# Patient Record
Sex: FEMALE | Race: WHITE | NOT HISPANIC OR LATINO | Employment: FULL TIME | URBAN - METROPOLITAN AREA
[De-identification: names, ages, dates, MRNs, and addresses within clinical notes are randomized per-mention and may not be internally consistent; named-entity substitution may affect disease eponyms.]

---

## 2023-03-03 LAB
ALANINE AMINOTRANSFERASE (SGPT) (U/L) IN SER/PLAS: 18 U/L (ref 7–45)
ALBUMIN (G/DL) IN SER/PLAS: 4.4 G/DL (ref 3.4–5)
ALKALINE PHOSPHATASE (U/L) IN SER/PLAS: 94 U/L (ref 33–110)
ANION GAP IN SER/PLAS: 10 MMOL/L (ref 10–20)
ASPARTATE AMINOTRANSFERASE (SGOT) (U/L) IN SER/PLAS: 17 U/L (ref 9–39)
BILIRUBIN TOTAL (MG/DL) IN SER/PLAS: 0.5 MG/DL (ref 0–1.2)
CALCIDIOL (25 OH VITAMIN D3) (NG/ML) IN SER/PLAS: 26 NG/ML
CALCIUM (MG/DL) IN SER/PLAS: 10 MG/DL (ref 8.6–10.6)
CARBON DIOXIDE, TOTAL (MMOL/L) IN SER/PLAS: 31 MMOL/L (ref 21–32)
CHLORIDE (MMOL/L) IN SER/PLAS: 106 MMOL/L (ref 98–107)
CHOLESTEROL (MG/DL) IN SER/PLAS: 202 MG/DL (ref 0–199)
CHOLESTEROL IN HDL (MG/DL) IN SER/PLAS: 57.4 MG/DL
CHOLESTEROL/HDL RATIO: 3.5
CREATININE (MG/DL) IN SER/PLAS: 0.63 MG/DL (ref 0.5–1.05)
ERYTHROCYTE DISTRIBUTION WIDTH (RATIO) BY AUTOMATED COUNT: 12.8 % (ref 11.5–14.5)
ERYTHROCYTE MEAN CORPUSCULAR HEMOGLOBIN CONCENTRATION (G/DL) BY AUTOMATED: 33.5 G/DL (ref 32–36)
ERYTHROCYTE MEAN CORPUSCULAR VOLUME (FL) BY AUTOMATED COUNT: 93 FL (ref 80–100)
ERYTHROCYTES (10*6/UL) IN BLOOD BY AUTOMATED COUNT: 4.01 X10E12/L (ref 4–5.2)
ESTIMATED AVERAGE GLUCOSE FOR HBA1C: 85 MG/DL
GFR FEMALE: >90 ML/MIN/1.73M2
GLUCOSE (MG/DL) IN SER/PLAS: 86 MG/DL (ref 74–99)
HEMATOCRIT (%) IN BLOOD BY AUTOMATED COUNT: 37.3 % (ref 36–46)
HEMOGLOBIN (G/DL) IN BLOOD: 12.5 G/DL (ref 12–16)
HEMOGLOBIN A1C/HEMOGLOBIN TOTAL IN BLOOD: 4.6 %
HEPATITIS B VIRUS SURFACE AB (MIU/ML) IN SERUM: 19 MIU/ML
HEPATITIS B VIRUS SURFACE AG PRESENCE IN SERUM: NONREACTIVE
HEPATITIS C VIRUS AB PRESENCE IN SERUM: NONREACTIVE
LDL: 118 MG/DL (ref 0–99)
LEUKOCYTES (10*3/UL) IN BLOOD BY AUTOMATED COUNT: 6.7 X10E9/L (ref 4.4–11.3)
NRBC (PER 100 WBCS) BY AUTOMATED COUNT: 0 /100 WBC (ref 0–0)
PLATELETS (10*3/UL) IN BLOOD AUTOMATED COUNT: 217 X10E9/L (ref 150–450)
POTASSIUM (MMOL/L) IN SER/PLAS: 5 MMOL/L (ref 3.5–5.3)
PROTEIN TOTAL: 7.4 G/DL (ref 6.4–8.2)
SODIUM (MMOL/L) IN SER/PLAS: 142 MMOL/L (ref 136–145)
THYROTROPIN (MIU/L) IN SER/PLAS BY DETECTION LIMIT <= 0.05 MIU/L: 1.35 MIU/L (ref 0.44–3.98)
TRIGLYCERIDE (MG/DL) IN SER/PLAS: 131 MG/DL (ref 0–149)
UREA NITROGEN (MG/DL) IN SER/PLAS: 12 MG/DL (ref 6–23)
VLDL: 26 MG/DL (ref 0–40)

## 2023-05-16 ENCOUNTER — OFFICE VISIT (OUTPATIENT)
Dept: PRIMARY CARE | Facility: CLINIC | Age: 33
End: 2023-05-16
Payer: COMMERCIAL

## 2023-05-16 VITALS
WEIGHT: 198 LBS | BODY MASS INDEX: 33.8 KG/M2 | HEIGHT: 64 IN | HEART RATE: 87 BPM | SYSTOLIC BLOOD PRESSURE: 108 MMHG | OXYGEN SATURATION: 97 % | DIASTOLIC BLOOD PRESSURE: 73 MMHG

## 2023-05-16 DIAGNOSIS — Z00.00 HEALTHCARE MAINTENANCE: Primary | ICD-10-CM

## 2023-05-16 DIAGNOSIS — E66.9 OBESITY (BMI 30-39.9): ICD-10-CM

## 2023-05-16 DIAGNOSIS — R63.5 WEIGHT GAIN: ICD-10-CM

## 2023-05-16 DIAGNOSIS — E78.5 HYPERLIPIDEMIA, UNSPECIFIED HYPERLIPIDEMIA TYPE: ICD-10-CM

## 2023-05-16 PROCEDURE — 99395 PREV VISIT EST AGE 18-39: CPT | Performed by: STUDENT IN AN ORGANIZED HEALTH CARE EDUCATION/TRAINING PROGRAM

## 2023-05-16 PROCEDURE — 3008F BODY MASS INDEX DOCD: CPT | Performed by: STUDENT IN AN ORGANIZED HEALTH CARE EDUCATION/TRAINING PROGRAM

## 2023-05-16 NOTE — PROGRESS NOTES
"Subjective   Patient ID: Radha Haley is a 32 y.o. female who presents for physical  HPI  Ms. Lu is 32 years old with known hyperlipidemia here for a physical.  Denies any complaints has weight concerns.  Review of Systems   Constitutional:  Negative for activity change and fever.   HENT:  Negative for congestion.    Respiratory:  Negative for cough, shortness of breath and wheezing.    Cardiovascular:  Negative for chest pain and leg swelling.   Gastrointestinal:  Negative for abdominal pain, constipation, nausea and vomiting.   Endocrine: Negative for cold intolerance.   Genitourinary:  Negative for dysuria, hematuria and urgency.   Neurological:  Negative for dizziness, speech difficulty, weakness and numbness.   Psychiatric/Behavioral:  Negative for self-injury and suicidal ideas.        Objective   Visit Vitals  /73   Pulse 87   Ht 1.626 m (5' 4\")   Wt 89.8 kg (198 lb)   SpO2 97%   BMI 33.99 kg/m²   BSA 2.01 m²      Physical Exam  HENT:      Head: Normocephalic and atraumatic.      Right Ear: Tympanic membrane normal.      Left Ear: Tympanic membrane normal.      Nose: Nose normal.      Mouth/Throat:      Mouth: Mucous membranes are moist.   Eyes:      Extraocular Movements: Extraocular movements intact.      Conjunctiva/sclera: Conjunctivae normal.      Pupils: Pupils are equal, round, and reactive to light.   Cardiovascular:      Rate and Rhythm: Normal rate and regular rhythm.      Pulses: Normal pulses.      Heart sounds: Normal heart sounds.   Pulmonary:      Effort: Pulmonary effort is normal.      Breath sounds: Normal breath sounds. No stridor. No rhonchi.   Abdominal:      General: Bowel sounds are normal.      Palpations: Abdomen is soft.      Tenderness: There is no abdominal tenderness. There is no guarding or rebound.   Musculoskeletal:      Cervical back: Neck supple.   Neurological:      Mental Status: She is oriented to person, place, and time.   Psychiatric:         Mood and Affect: Mood " normal.         Behavior: Behavior normal.         Assessment/Plan          Problem List Items Addressed This Visit    None  Visit Diagnoses       Healthcare maintenance    -  Primary    Relevant Orders    Lipid Panel (Completed)    CBC (Completed)    Comprehensive Metabolic Panel (Completed)    TSH with reflex to Free T4 if abnormal (Completed)    Hemoglobin A1C (Completed)    Testosterone (Completed)    Referral to Comprehensive Weight Loss    Referral to Nutrition Services    Vitamin D 25 hydroxy (Completed)    BMI 33.0-33.9,adult        Relevant Orders    Referral to Comprehensive Weight Loss    Referral to Nutrition Services    Obesity (BMI 30-39.9)        Relevant Orders    Referral to Comprehensive Weight Loss    Referral to Nutrition Services    Weight gain        Relevant Orders    TSH with reflex to Free T4 if abnormal (Completed)    Testosterone (Completed)    Hyperlipidemia, unspecified hyperlipidemia type        Relevant Orders    Lipid Panel (Completed)    TSH with reflex to Free T4 if abnormal (Completed)        Overall patient has a physical exam within normal limits except for BMI of 33.99.  Advise her to see nutrition services along with comprehensive weight loss program.  As per hyperlipidemia, we will get repeat labs and update her current labs.  Once results are obtained we will call her with abnormal results of any and discuss further evaluation and manage

## 2023-05-24 LAB
ALANINE AMINOTRANSFERASE (SGPT) (U/L) IN SER/PLAS: 21 U/L (ref 7–45)
ALBUMIN (G/DL) IN SER/PLAS: 4.5 G/DL (ref 3.4–5)
ALKALINE PHOSPHATASE (U/L) IN SER/PLAS: 88 U/L (ref 33–110)
ANION GAP IN SER/PLAS: 13 MMOL/L (ref 10–20)
ASPARTATE AMINOTRANSFERASE (SGOT) (U/L) IN SER/PLAS: 17 U/L (ref 9–39)
BILIRUBIN TOTAL (MG/DL) IN SER/PLAS: 0.3 MG/DL (ref 0–1.2)
CALCIDIOL (25 OH VITAMIN D3) (NG/ML) IN SER/PLAS: 28 NG/ML
CALCIUM (MG/DL) IN SER/PLAS: 9 MG/DL (ref 8.6–10.6)
CARBON DIOXIDE, TOTAL (MMOL/L) IN SER/PLAS: 24 MMOL/L (ref 21–32)
CHLORIDE (MMOL/L) IN SER/PLAS: 110 MMOL/L (ref 98–107)
CHOLESTEROL (MG/DL) IN SER/PLAS: 160 MG/DL (ref 0–199)
CHOLESTEROL IN HDL (MG/DL) IN SER/PLAS: 37.1 MG/DL
CHOLESTEROL/HDL RATIO: 4.3
CREATININE (MG/DL) IN SER/PLAS: 0.58 MG/DL (ref 0.5–1.05)
ERYTHROCYTE DISTRIBUTION WIDTH (RATIO) BY AUTOMATED COUNT: 12.4 % (ref 11.5–14.5)
ERYTHROCYTE MEAN CORPUSCULAR HEMOGLOBIN CONCENTRATION (G/DL) BY AUTOMATED: 33.4 G/DL (ref 32–36)
ERYTHROCYTE MEAN CORPUSCULAR VOLUME (FL) BY AUTOMATED COUNT: 94 FL (ref 80–100)
ERYTHROCYTES (10*6/UL) IN BLOOD BY AUTOMATED COUNT: 3.98 X10E12/L (ref 4–5.2)
ESTIMATED AVERAGE GLUCOSE FOR HBA1C: 88 MG/DL
GFR FEMALE: >90 ML/MIN/1.73M2
GLUCOSE (MG/DL) IN SER/PLAS: 97 MG/DL (ref 74–99)
HEMATOCRIT (%) IN BLOOD BY AUTOMATED COUNT: 37.4 % (ref 36–46)
HEMOGLOBIN (G/DL) IN BLOOD: 12.5 G/DL (ref 12–16)
HEMOGLOBIN A1C/HEMOGLOBIN TOTAL IN BLOOD: 4.7 %
LDL: 94 MG/DL (ref 0–99)
LEUKOCYTES (10*3/UL) IN BLOOD BY AUTOMATED COUNT: 7 X10E9/L (ref 4.4–11.3)
NRBC (PER 100 WBCS) BY AUTOMATED COUNT: 0 /100 WBC (ref 0–0)
PLATELETS (10*3/UL) IN BLOOD AUTOMATED COUNT: 206 X10E9/L (ref 150–450)
POTASSIUM (MMOL/L) IN SER/PLAS: 4.4 MMOL/L (ref 3.5–5.3)
PROTEIN TOTAL: 7.2 G/DL (ref 6.4–8.2)
SODIUM (MMOL/L) IN SER/PLAS: 143 MMOL/L (ref 136–145)
TESTOSTERONE (NG/DL) IN SER/PLAS: <60 NG/DL (ref 0–70)
THYROTROPIN (MIU/L) IN SER/PLAS BY DETECTION LIMIT <= 0.05 MIU/L: 1.31 MIU/L (ref 0.44–3.98)
TRIGLYCERIDE (MG/DL) IN SER/PLAS: 145 MG/DL (ref 0–149)
UREA NITROGEN (MG/DL) IN SER/PLAS: 10 MG/DL (ref 6–23)
VLDL: 29 MG/DL (ref 0–40)

## 2023-05-24 PROCEDURE — 84403 ASSAY OF TOTAL TESTOSTERONE: CPT

## 2023-05-24 PROCEDURE — 80053 COMPREHEN METABOLIC PANEL: CPT

## 2023-05-24 PROCEDURE — 84443 ASSAY THYROID STIM HORMONE: CPT

## 2023-05-24 PROCEDURE — 82306 VITAMIN D 25 HYDROXY: CPT

## 2023-05-24 PROCEDURE — 85027 COMPLETE CBC AUTOMATED: CPT

## 2023-05-24 PROCEDURE — 83036 HEMOGLOBIN GLYCOSYLATED A1C: CPT

## 2023-05-24 PROCEDURE — 80061 LIPID PANEL: CPT

## 2023-06-02 ASSESSMENT — ENCOUNTER SYMPTOMS
VOMITING: 0
DIZZINESS: 0
SHORTNESS OF BREATH: 0
ACTIVITY CHANGE: 0
WHEEZING: 0
FEVER: 0
CONSTIPATION: 0
SPEECH DIFFICULTY: 0
HEMATURIA: 0
NAUSEA: 0
COUGH: 0
ABDOMINAL PAIN: 0
WEAKNESS: 0
NUMBNESS: 0
DYSURIA: 0

## 2023-06-16 ENCOUNTER — TELEPHONE (OUTPATIENT)
Dept: PRIMARY CARE | Facility: CLINIC | Age: 33
End: 2023-06-16
Payer: COMMERCIAL

## 2023-06-16 NOTE — TELEPHONE ENCOUNTER
Result Communication    Resulted Orders   US pelvis transvaginal    Narrative    Interpreted By:  FRANK ROBBINS MD  MRN: [id number]  Patient Name: DAYLIN COLLINS     STUDY:  US TRANSVAGINAL;  6/13/2023 8:46 am     INDICATION:  none  N83.209: Benign ovarian cyst.     COMPARISON:  03/06/2023     ACCESSION NUMBER(S):  [id number]     ORDERING CLINICIAN:  THERESE BARAKAT     TECHNIQUE:  Transabdominal and transvaginal pelvic ultrasound was performed.  Multiple multiplanar static gray scale, color and spectral Doppler  images were obtained.     FINDINGS:  Limited transabdominal evaluation due to non distended bladder and  patient's body habitus     UTERUS:  Measures 9.1 x 3.8 by 5.9 cm. No focal myometrial mass  demonstrated. The endometrial stripe measures 7.3 mm in thickness.  IUD on the previous exam is no longer seen. Few punctate echogenic  foci in the cervix.     RIGHT ADNEXA:   The right ovary measures 3.8 x 1.7 by 2.3 cm.  Contains several follicles. Intact flow on limited doppler evaluation.     LEFT ADNEXA:  The left ovary measures 3.6 x 2.4 by 2.5 cm. Contains a  mixed echogenicity 2.0 x 1.8 x 1.3 cm nodule containing 0.8 cm  echogenic nonshadowing area. This may correspond to 1.6 x 1.5 by 1.6  cm nodule measured on the previous exam. intact flow on limited  doppler evaluation.     No significant free fluid demonstrated.       Impression    Persistent 2 cm complex lesion in the left ovary. Differential  includes hemorrhagic/physiologic cyst, endometrioma and small  dermoid. Consider additional follow-up exam in 3-6 months to assess  stability/resolution as clinically warranted.     Previously noted IUD is no longer seen.       12:06 PM    Gynecology ref  Patient has a Gyn

## 2023-09-20 LAB
CHLAMYDIA TRACH., AMPLIFIED: NEGATIVE
N. GONORRHEA, AMPLIFIED: NEGATIVE
TRICHOMONAS VAGINALIS: NEGATIVE

## 2023-09-21 LAB — URINE CULTURE: NORMAL

## 2023-09-22 LAB
ABO GROUP (TYPE) IN BLOOD: NORMAL
ALANINE AMINOTRANSFERASE (SGPT) (U/L) IN SER/PLAS: 14 U/L (ref 7–45)
ALBUMIN (G/DL) IN SER/PLAS: 4.4 G/DL (ref 3.4–5)
ALKALINE PHOSPHATASE (U/L) IN SER/PLAS: 78 U/L (ref 33–110)
ANION GAP IN SER/PLAS: 16 MMOL/L (ref 10–20)
ANTIBODY SCREEN: NORMAL
ASPARTATE AMINOTRANSFERASE (SGOT) (U/L) IN SER/PLAS: 12 U/L (ref 9–39)
BILIRUBIN TOTAL (MG/DL) IN SER/PLAS: 0.5 MG/DL (ref 0–1.2)
CALCIUM (MG/DL) IN SER/PLAS: 9.5 MG/DL (ref 8.6–10.6)
CARBON DIOXIDE, TOTAL (MMOL/L) IN SER/PLAS: 23 MMOL/L (ref 21–32)
CHLORIDE (MMOL/L) IN SER/PLAS: 105 MMOL/L (ref 98–107)
CREATININE (MG/DL) IN SER/PLAS: 0.52 MG/DL (ref 0.5–1.05)
ERYTHROCYTE DISTRIBUTION WIDTH (RATIO) BY AUTOMATED COUNT: 12.7 % (ref 11.5–14.5)
ERYTHROCYTE MEAN CORPUSCULAR HEMOGLOBIN CONCENTRATION (G/DL) BY AUTOMATED: 33.4 G/DL (ref 32–36)
ERYTHROCYTE MEAN CORPUSCULAR VOLUME (FL) BY AUTOMATED COUNT: 96 FL (ref 80–100)
ERYTHROCYTES (10*6/UL) IN BLOOD BY AUTOMATED COUNT: 3.82 X10E12/L (ref 4–5.2)
ESTIMATED AVERAGE GLUCOSE FOR HBA1C: 82 MG/DL
GFR FEMALE: >90 ML/MIN/1.73M2
GLUCOSE (MG/DL) IN SER/PLAS: 102 MG/DL (ref 74–99)
HEMATOCRIT (%) IN BLOOD BY AUTOMATED COUNT: 36.8 % (ref 36–46)
HEMOGLOBIN (G/DL) IN BLOOD: 12.3 G/DL (ref 12–16)
HEMOGLOBIN A1C/HEMOGLOBIN TOTAL IN BLOOD: 4.5 %
HEPATITIS B VIRUS SURFACE AG PRESENCE IN SERUM: NONREACTIVE
HEPATITIS C VIRUS AB PRESENCE IN SERUM: NONREACTIVE
HIV 1/ 2 AG/AB SCREEN: NONREACTIVE
LEUKOCYTES (10*3/UL) IN BLOOD BY AUTOMATED COUNT: 10.8 X10E9/L (ref 4.4–11.3)
NRBC (PER 100 WBCS) BY AUTOMATED COUNT: 0 /100 WBC (ref 0–0)
PLATELETS (10*3/UL) IN BLOOD AUTOMATED COUNT: 242 X10E9/L (ref 150–450)
POTASSIUM (MMOL/L) IN SER/PLAS: 4.2 MMOL/L (ref 3.5–5.3)
PROTEIN TOTAL: 7.2 G/DL (ref 6.4–8.2)
REFLEX ADDED, ANEMIA PANEL: ABNORMAL
RH FACTOR: NORMAL
RUBELLA VIRUS IGG AB: POSITIVE
SODIUM (MMOL/L) IN SER/PLAS: 140 MMOL/L (ref 136–145)
SYPHILIS TOTAL AB: NONREACTIVE
UREA NITROGEN (MG/DL) IN SER/PLAS: 8 MG/DL (ref 6–23)

## 2023-09-27 LAB
HEMOGLOBIN A2: 2.7 %
HEMOGLOBIN A: 95.6 %
HEMOGLOBIN F: 1.7 %
HEMOGLOBIN IDENTIFICATION INTERPRETATION: NORMAL
PATH REVIEW-HGB IDENTIFICATION: NORMAL

## 2023-10-05 ENCOUNTER — TELEPHONE (OUTPATIENT)
Dept: OBSTETRICS AND GYNECOLOGY | Facility: CLINIC | Age: 33
End: 2023-10-05
Payer: COMMERCIAL

## 2023-10-17 ENCOUNTER — ANCILLARY PROCEDURE (OUTPATIENT)
Dept: RADIOLOGY | Facility: CLINIC | Age: 33
End: 2023-10-17
Payer: COMMERCIAL

## 2023-10-17 DIAGNOSIS — O34.219 PREVIOUS CESAREAN DELIVERY AFFECTING PREGNANCY (HHS-HCC): ICD-10-CM

## 2023-10-17 DIAGNOSIS — Z36.82 ENCOUNTER FOR (NT) NUCHAL TRANSLUCENCY SCAN (HHS-HCC): ICD-10-CM

## 2023-10-17 DIAGNOSIS — Z32.01 PREGNANCY TEST POSITIVE (HHS-HCC): ICD-10-CM

## 2023-10-17 PROBLEM — L30.1 DYSHIDROSIS (POMPHOLYX): Status: ACTIVE | Noted: 2023-07-10

## 2023-10-17 PROBLEM — E66.9 CLASS 1 OBESITY WITH BODY MASS INDEX (BMI) OF 33.0 TO 33.9 IN ADULT: Status: ACTIVE | Noted: 2023-10-17

## 2023-10-17 PROBLEM — L28.0 LICHEN SIMPLEX CHRONICUS: Status: ACTIVE | Noted: 2023-07-10

## 2023-10-17 PROBLEM — N83.209 BENIGN OVARIAN CYST: Status: ACTIVE | Noted: 2023-10-17

## 2023-10-17 PROBLEM — O21.9 NAUSEA AND VOMITING IN PREGNANCY (HHS-HCC): Status: ACTIVE | Noted: 2023-10-17

## 2023-10-17 PROBLEM — Z86.32 HISTORY OF GESTATIONAL DIABETES: Status: ACTIVE | Noted: 2023-10-17

## 2023-10-17 PROBLEM — R53.83 FATIGUE: Status: ACTIVE | Noted: 2023-10-17

## 2023-10-17 PROBLEM — E66.811 CLASS 1 OBESITY WITH BODY MASS INDEX (BMI) OF 33.0 TO 33.9 IN ADULT: Status: ACTIVE | Noted: 2023-10-17

## 2023-10-17 PROBLEM — O09.41 HIGH RISK MULTIGRAVIDA, FIRST TRIMESTER (HHS-HCC): Status: ACTIVE | Noted: 2023-10-17

## 2023-10-17 PROBLEM — O99.210 OBESITY AFFECTING PREGNANCY, ANTEPARTUM (HHS-HCC): Status: ACTIVE | Noted: 2023-10-17

## 2023-10-17 PROCEDURE — 76801 OB US < 14 WKS SINGLE FETUS: CPT

## 2023-10-17 PROCEDURE — 76813 OB US NUCHAL MEAS 1 GEST: CPT | Performed by: OBSTETRICS & GYNECOLOGY

## 2023-10-17 PROCEDURE — 76813 OB US NUCHAL MEAS 1 GEST: CPT

## 2023-10-17 RX ORDER — TRIAMCINOLONE ACETONIDE 1 MG/G
1 CREAM TOPICAL
COMMUNITY
Start: 2023-07-10 | End: 2023-10-18 | Stop reason: WASHOUT

## 2023-10-17 RX ORDER — B-COMPLEX WITH VITAMIN C
1 TABLET ORAL DAILY
Status: ON HOLD | COMMUNITY
Start: 2023-06-15 | End: 2024-04-07 | Stop reason: SDUPTHER

## 2023-10-17 RX ORDER — ERGOCALCIFEROL (VITAMIN D2) 10 MCG
1 TABLET ORAL DAILY
COMMUNITY
End: 2023-10-18 | Stop reason: SDUPTHER

## 2023-10-17 RX ORDER — CLOBETASOL PROPIONATE 0.5 MG/G
OINTMENT TOPICAL
COMMUNITY
Start: 2023-07-10 | End: 2023-10-18 | Stop reason: WASHOUT

## 2023-10-17 RX ORDER — HYDROCORTISONE 25 MG/G
OINTMENT TOPICAL
COMMUNITY
Start: 2023-08-21 | End: 2023-10-18 | Stop reason: WASHOUT

## 2023-10-17 RX ORDER — LANOLIN ALCOHOL/MO/W.PET/CERES
50 CREAM (GRAM) TOPICAL NIGHTLY
COMMUNITY
Start: 2023-09-19 | End: 2023-10-18 | Stop reason: WASHOUT

## 2023-10-17 RX ORDER — DIPHENHYDRAMINE HCL 50 MG
1 CAPSULE ORAL NIGHTLY PRN
COMMUNITY
Start: 2023-09-19 | End: 2023-10-18 | Stop reason: WASHOUT

## 2023-10-18 ENCOUNTER — ROUTINE PRENATAL (OUTPATIENT)
Dept: OBSTETRICS AND GYNECOLOGY | Facility: CLINIC | Age: 33
End: 2023-10-18
Payer: COMMERCIAL

## 2023-10-18 ENCOUNTER — LAB (OUTPATIENT)
Dept: LAB | Facility: LAB | Age: 33
End: 2023-10-18
Payer: COMMERCIAL

## 2023-10-18 VITALS — WEIGHT: 202 LBS | DIASTOLIC BLOOD PRESSURE: 62 MMHG | BODY MASS INDEX: 34.67 KG/M2 | SYSTOLIC BLOOD PRESSURE: 124 MMHG

## 2023-10-18 DIAGNOSIS — O99.210 OBESITY IN PREGNANCY (HHS-HCC): ICD-10-CM

## 2023-10-18 DIAGNOSIS — O09.41 HIGH RISK MULTIGRAVIDA, FIRST TRIMESTER (HHS-HCC): Primary | ICD-10-CM

## 2023-10-18 DIAGNOSIS — O21.9 NAUSEA AND VOMITING IN PREGNANCY (HHS-HCC): ICD-10-CM

## 2023-10-18 DIAGNOSIS — O09.299 HISTORY OF GESTATIONAL DIABETES IN PRIOR PREGNANCY, CURRENTLY PREGNANT (HHS-HCC): ICD-10-CM

## 2023-10-18 DIAGNOSIS — Z86.32 HISTORY OF GESTATIONAL DIABETES IN PRIOR PREGNANCY, CURRENTLY PREGNANT (HHS-HCC): ICD-10-CM

## 2023-10-18 LAB — TSH SERPL-ACNC: 0.4 MIU/L (ref 0.44–3.98)

## 2023-10-18 PROCEDURE — 0501F PRENATAL FLOW SHEET: CPT | Performed by: OBSTETRICS & GYNECOLOGY

## 2023-10-18 PROCEDURE — 36415 COLL VENOUS BLD VENIPUNCTURE: CPT

## 2023-10-18 PROCEDURE — 84443 ASSAY THYROID STIM HORMONE: CPT

## 2023-10-18 RX ORDER — ONDANSETRON HYDROCHLORIDE 8 MG/1
8 TABLET, FILM COATED ORAL 2 TIMES DAILY PRN
Qty: 20 TABLET | Refills: 3 | Status: SHIPPED | OUTPATIENT
Start: 2023-10-18 | End: 2023-12-17

## 2023-10-18 ASSESSMENT — ENCOUNTER SYMPTOMS: GASTROINTESTINAL NEGATIVE: 1

## 2023-10-18 NOTE — PROGRESS NOTES
33-year-old -0-0-3 woman presents for return OB visit.  Her pregnancy is dated by an LMP consistent with a 10-week 0-day ultrasound on 2023.  Her previous pregnancies were complicated by an LGA infant and a history of insulin-dependent gestational diabetes.     normal joel IUP at 10 weeks.    The patient continues to report significant nausea and vomiting.  She states that she throws up 1-2 times per day and she did not experience such symptoms with previous pregnancy.    A: HROB, H/O GDM with a prior pregnancy, N&V   P: Zofran rx       Check TSH       First Check scheduled

## 2023-11-12 DIAGNOSIS — O09.41 HIGH RISK MULTIGRAVIDA, FIRST TRIMESTER (HHS-HCC): Primary | ICD-10-CM

## 2023-11-15 ENCOUNTER — ROUTINE PRENATAL (OUTPATIENT)
Dept: OBSTETRICS AND GYNECOLOGY | Facility: CLINIC | Age: 33
End: 2023-11-15
Payer: COMMERCIAL

## 2023-11-15 VITALS — WEIGHT: 202.4 LBS | SYSTOLIC BLOOD PRESSURE: 110 MMHG | BODY MASS INDEX: 34.74 KG/M2 | DIASTOLIC BLOOD PRESSURE: 74 MMHG

## 2023-11-15 DIAGNOSIS — E66.9 CLASS 1 OBESITY WITHOUT SERIOUS COMORBIDITY WITH BODY MASS INDEX (BMI) OF 33.0 TO 33.9 IN ADULT, UNSPECIFIED OBESITY TYPE: ICD-10-CM

## 2023-11-15 DIAGNOSIS — Z86.32 HISTORY OF GESTATIONAL DIABETES: Primary | ICD-10-CM

## 2023-11-15 DIAGNOSIS — N85.A UTERINE SCAR FROM PREVIOUS CESAREAN DELIVERY: ICD-10-CM

## 2023-11-15 DIAGNOSIS — O21.9 NAUSEA AND VOMITING IN PREGNANCY (HHS-HCC): ICD-10-CM

## 2023-11-15 PROBLEM — L28.0 LICHEN SIMPLEX CHRONICUS: Status: RESOLVED | Noted: 2023-07-10 | Resolved: 2023-11-15

## 2023-11-15 PROBLEM — N83.209 BENIGN OVARIAN CYST: Status: RESOLVED | Noted: 2023-10-17 | Resolved: 2023-11-15

## 2023-11-15 PROBLEM — L30.1 DYSHIDROSIS (POMPHOLYX): Status: RESOLVED | Noted: 2023-07-10 | Resolved: 2023-11-15

## 2023-11-15 PROCEDURE — 0501F PRENATAL FLOW SHEET: CPT | Performed by: OBSTETRICS & GYNECOLOGY

## 2023-11-15 RX ORDER — METOCLOPRAMIDE 10 MG/1
10 TABLET ORAL ONCE AS NEEDED
Qty: 30 TABLET | Refills: 1 | Status: SHIPPED | OUTPATIENT
Start: 2023-11-15 | End: 2024-04-07 | Stop reason: HOSPADM

## 2023-11-15 NOTE — PROGRESS NOTES
Nl NT. Serum screening declined  PNL wnl  NVP: emesis decreased but very nauseated. Tried B6, Zofran. No GERD. Worries that she won't be able to monitor her diet as well if she has GDM, and she only feels better if she eats carbs.  H/o GDMA2 and feels paranoid that she has GDM already. States she was tested at 18 weeks at the last pregnancy bc she had a 10#11oz the prior pregnancy. Next baby was treated with insulin and was only 8#.  Prior CS for 10#11oz baby with  of a 8#13oz baby after IOL at 39 weeks. Pt would like to  again.  Flu vaccine already received.     Jazmyn Medina MD

## 2023-11-28 ENCOUNTER — ANCILLARY PROCEDURE (OUTPATIENT)
Dept: RADIOLOGY | Facility: CLINIC | Age: 33
End: 2023-11-28
Payer: COMMERCIAL

## 2023-11-28 DIAGNOSIS — Z34.90 PREGNANT (HHS-HCC): ICD-10-CM

## 2023-11-28 PROCEDURE — 76811 OB US DETAILED SNGL FETUS: CPT | Performed by: OBSTETRICS & GYNECOLOGY

## 2023-11-28 PROCEDURE — 76811 OB US DETAILED SNGL FETUS: CPT

## 2023-12-04 DIAGNOSIS — R73.01 ELEVATED FASTING GLUCOSE: Primary | ICD-10-CM

## 2023-12-04 DIAGNOSIS — O24.419 GESTATIONAL DIABETES MELLITUS (GDM) IN SECOND TRIMESTER, GESTATIONAL DIABETES METHOD OF CONTROL UNSPECIFIED (HHS-HCC): Primary | ICD-10-CM

## 2023-12-05 DIAGNOSIS — Z86.32 HISTORY OF GESTATIONAL DIABETES: ICD-10-CM

## 2023-12-05 RX ORDER — BLOOD-GLUCOSE CONTROL, NORMAL
1 EACH MISCELLANEOUS 4 TIMES DAILY
Qty: 100 EACH | Refills: 11 | Status: SHIPPED | OUTPATIENT
Start: 2023-12-05 | End: 2023-12-07 | Stop reason: SDUPTHER

## 2023-12-06 ENCOUNTER — TELEPHONE (OUTPATIENT)
Dept: MATERNAL FETAL MEDICINE | Facility: CLINIC | Age: 33
End: 2023-12-06
Payer: COMMERCIAL

## 2023-12-07 ENCOUNTER — TELEPHONE (OUTPATIENT)
Dept: OBSTETRICS AND GYNECOLOGY | Facility: HOSPITAL | Age: 33
End: 2023-12-07
Payer: COMMERCIAL

## 2023-12-07 DIAGNOSIS — Z86.32 HISTORY OF GESTATIONAL DIABETES: ICD-10-CM

## 2023-12-07 DIAGNOSIS — O24.414 INSULIN CONTROLLED GESTATIONAL DIABETES MELLITUS (GDM) IN SECOND TRIMESTER (HHS-HCC): Primary | ICD-10-CM

## 2023-12-07 RX ORDER — DEXTROSE 4 G
TABLET,CHEWABLE ORAL
Qty: 1 EACH | Refills: 0 | Status: SHIPPED | OUTPATIENT
Start: 2023-12-07

## 2023-12-07 RX ORDER — FLASH GLUCOSE SCANNING READER
EACH MISCELLANEOUS
Qty: 1 EACH | Refills: 0 | Status: SHIPPED | OUTPATIENT
Start: 2023-12-07 | End: 2023-12-11 | Stop reason: ALTCHOICE

## 2023-12-07 RX ORDER — BLOOD-GLUCOSE CONTROL, NORMAL
1 EACH MISCELLANEOUS 4 TIMES DAILY
Qty: 100 EACH | Refills: 11 | Status: SHIPPED | OUTPATIENT
Start: 2023-12-07 | End: 2023-12-11 | Stop reason: ALTCHOICE

## 2023-12-07 RX ORDER — INSULIN HUMAN 100 [IU]/ML
10 INJECTION, SUSPENSION SUBCUTANEOUS NIGHTLY
Qty: 3 ML | Refills: 11 | Status: SHIPPED | OUTPATIENT
Start: 2023-12-07 | End: 2023-12-11 | Stop reason: WASHOUT

## 2023-12-08 ENCOUNTER — NUTRITION (OUTPATIENT)
Dept: OBSTETRICS AND GYNECOLOGY | Facility: CLINIC | Age: 33
End: 2023-12-08
Payer: COMMERCIAL

## 2023-12-08 NOTE — PROGRESS NOTES
Phone nutrition consult for diet education today r/t diabetes in pregnancy : GDM    GA: 21-1    Pt w/ h/o GDM and Nutrition counseling in past pregnancy. Would like a refresher.    Began HS insulin yesterday.    Discussed w/pt:    Recommended Daily carbohydrate distribution:    Breakfast: 1-2 carbohydrate servings (15-30g CHO) --  avoid concentrated sweets/high GI foods: fruit, fruit juices, cold cereal and milk, syrup, jams and jellies, etc.  AM Snack:  1-2 servings   Lunch:  3-4 servings (45-60g CHO)  Midday Snack:  1-2 servings   Dinner: 3-4 servings   HS Snack:  2 servings (30g)    -Be sure to add protein food to carb choices at each meal and snack: cheese, meat, eggs, nuts, peanut butter, etc    -During the day, eat about every 2 - 4 hours.     -Avoid more than 8-10 hours overnight without eating. If more than about 2hrs between dinner and bedtime, have a carb plus protein snack.     -Higher fiber carbohydrates/whole grains are preferred over refined carb choices.    -- Avoid juices and sugar-sweetened beverages. Diet drinks are fine. Try eliminating milk at mealtime if noticing after-meal BG elevations. Unsweetened almond or soymilk beverages are a lower-carb dairy alternative.    -Recommended Dietary Allowance for carbohydrates during pregnancy is a minimum of 175g/day (11-12 servings) to provide 33g/day for fetal brain development. Many women do well eating a bit less than 175g CHO/day--this is fine, as long as there is no purposeful, severe restriction of carb foods at meals and snacks (ex: Keto or Atkins-style diets for BG control) and no symptoms indicating inadequate carb intake-- loss of weight, feeling dizzy/lightheaded, irritable, confused, excessively hungry, etc.    Education materials emailed: Diet for GDM; Sample GDM Meals and Snacks; Lower Sugar Yogurts;  BG Log Sheet; Zevia and infused water recipe website links.    Goals: BG control, GDM diet as tolerated.    Pt expresses understanding and  agreement.     Follow-up as needed to assess goal attainment. Modifications based on further assessment and self-blood glucose monitoring results.    Visit time: 30min

## 2023-12-08 NOTE — TELEPHONE ENCOUNTER
Pt calling reporting hyperglycemia to 180s after eating and not feeling well.  She also reports her fasting this AM was 115 and they have been increasing over the past week.  No polydipsia. No polyuria. No oliguria.   No HA. No CP. No abd pain.  She has only waited about 10 minutes since checking her BSG POCT.    - Recommend expectant mgmt over the next hour and see if she feels better with rest and hydration  - Pt's glucometer, lancets, and test strips prescription sent to [facility] instead of [facility] at her request. Cont checking QID.  - Start NPH at bedtime 10 units  - CGM also ordered for patient. Pt will likely not apply until she comes back to the office as she has never used one before  - Pt has MFM apptmt with Dr. Peña on 12/11    Call back if not feeling better.    Jazmyn Medina MD

## 2023-12-11 ENCOUNTER — TELEPHONE (OUTPATIENT)
Dept: MATERNAL FETAL MEDICINE | Facility: HOSPITAL | Age: 33
End: 2023-12-11

## 2023-12-11 ENCOUNTER — INITIAL PRENATAL (OUTPATIENT)
Dept: MATERNAL FETAL MEDICINE | Facility: CLINIC | Age: 33
End: 2023-12-11
Payer: COMMERCIAL

## 2023-12-11 VITALS — SYSTOLIC BLOOD PRESSURE: 115 MMHG | DIASTOLIC BLOOD PRESSURE: 71 MMHG | BODY MASS INDEX: 33.95 KG/M2 | WEIGHT: 197.8 LBS

## 2023-12-11 DIAGNOSIS — Z3A.21 21 WEEKS GESTATION OF PREGNANCY (HHS-HCC): Primary | ICD-10-CM

## 2023-12-11 DIAGNOSIS — O24.414 INSULIN CONTROLLED GESTATIONAL DIABETES MELLITUS (GDM) DURING PREGNANCY, ANTEPARTUM (HHS-HCC): Primary | ICD-10-CM

## 2023-12-11 DIAGNOSIS — O24.419 GESTATIONAL DIABETES MELLITUS (GDM) IN SECOND TRIMESTER, GESTATIONAL DIABETES METHOD OF CONTROL UNSPECIFIED (HHS-HCC): ICD-10-CM

## 2023-12-11 DIAGNOSIS — O24.414 INSULIN CONTROLLED GESTATIONAL DIABETES MELLITUS (GDM) IN SECOND TRIMESTER (HHS-HCC): ICD-10-CM

## 2023-12-11 DIAGNOSIS — O24.419 GDM, CLASS A2 (HHS-HCC): ICD-10-CM

## 2023-12-11 PROCEDURE — 99214 OFFICE O/P EST MOD 30 MIN: CPT | Performed by: OBSTETRICS & GYNECOLOGY

## 2023-12-11 PROCEDURE — 99204 OFFICE O/P NEW MOD 45 MIN: CPT | Performed by: OBSTETRICS & GYNECOLOGY

## 2023-12-11 RX ORDER — ISOPROPYL ALCOHOL 70 ML/100ML
SWAB TOPICAL
Qty: 200 EACH | Refills: 3 | Status: SHIPPED | OUTPATIENT
Start: 2023-12-11

## 2023-12-11 RX ORDER — PEN NEEDLE, DIABETIC 30 GX3/16"
NEEDLE, DISPOSABLE MISCELLANEOUS
Qty: 200 EACH | Refills: 3 | Status: SHIPPED | OUTPATIENT
Start: 2023-12-11 | End: 2023-12-11

## 2023-12-11 RX ORDER — ISOPROPYL ALCOHOL 70 ML/100ML
SWAB TOPICAL
Qty: 200 EACH | Refills: 3 | Status: SHIPPED | OUTPATIENT
Start: 2023-12-11 | End: 2023-12-11 | Stop reason: SDUPTHER

## 2023-12-11 RX ORDER — LANCETS
EACH MISCELLANEOUS
Qty: 200 EACH | Refills: 3 | Status: SHIPPED | OUTPATIENT
Start: 2023-12-11 | End: 2023-12-11 | Stop reason: SDUPTHER

## 2023-12-11 RX ORDER — INSULIN HUMAN 100 [IU]/ML
15 INJECTION, SUSPENSION SUBCUTANEOUS NIGHTLY
Qty: 5 EACH | Refills: 3 | Status: SHIPPED | OUTPATIENT
Start: 2023-12-11 | End: 2023-12-11 | Stop reason: SDUPTHER

## 2023-12-11 RX ORDER — BLOOD SUGAR DIAGNOSTIC
STRIP MISCELLANEOUS
Qty: 150 EACH | Refills: 3 | Status: SHIPPED | OUTPATIENT
Start: 2023-12-11 | End: 2023-12-11 | Stop reason: SDUPTHER

## 2023-12-11 RX ORDER — LANCETS
EACH MISCELLANEOUS
Qty: 200 EACH | Refills: 3 | Status: SHIPPED | OUTPATIENT
Start: 2023-12-11 | End: 2024-01-08 | Stop reason: SDUPTHER

## 2023-12-11 RX ORDER — INSULIN HUMAN 100 [IU]/ML
15 INJECTION, SUSPENSION SUBCUTANEOUS NIGHTLY
Qty: 5 EACH | Refills: 3 | Status: SHIPPED | OUTPATIENT
Start: 2023-12-11 | End: 2024-03-05 | Stop reason: SDUPTHER

## 2023-12-11 RX ORDER — FLASH GLUCOSE SCANNING READER
EACH MISCELLANEOUS
Qty: 1 EACH | Refills: 0 | Status: SHIPPED | OUTPATIENT
Start: 2023-12-11 | End: 2023-12-11 | Stop reason: SDUPTHER

## 2023-12-11 RX ORDER — FLASH GLUCOSE SCANNING READER
EACH MISCELLANEOUS
Qty: 1 EACH | Refills: 0 | Status: SHIPPED | OUTPATIENT
Start: 2023-12-11

## 2023-12-11 RX ORDER — BLOOD SUGAR DIAGNOSTIC
STRIP MISCELLANEOUS
Qty: 150 EACH | Refills: 3 | Status: SHIPPED | OUTPATIENT
Start: 2023-12-11 | End: 2024-03-04 | Stop reason: SDUPTHER

## 2023-12-11 RX ORDER — FLASH GLUCOSE SENSOR
KIT MISCELLANEOUS
Qty: 2 EACH | Refills: 11 | Status: SHIPPED | OUTPATIENT
Start: 2023-12-11 | End: 2023-12-11 | Stop reason: SDUPTHER

## 2023-12-11 RX ORDER — FLASH GLUCOSE SENSOR
KIT MISCELLANEOUS
Qty: 2 EACH | Refills: 11 | Status: SHIPPED | OUTPATIENT
Start: 2023-12-11

## 2023-12-11 RX ORDER — PEN NEEDLE, DIABETIC 30 GX3/16"
NEEDLE, DISPOSABLE MISCELLANEOUS
Qty: 100 EACH | Refills: 11 | Status: SHIPPED | OUTPATIENT
Start: 2023-12-11 | End: 2024-01-29 | Stop reason: SDUPTHER

## 2023-12-11 NOTE — PROGRESS NOTES
2023   Radha Haley     [facility] CONSULT NOTE  Referring Clinician: Jazmyn Medina  Reason for consult: GDM    HPI: Radha Haley is a 33 y.o.  at 21w4d here for consult for A2DM.     Overall doing well.  Denies contractions, bleeding, or LOF. Reports normal fetal movement.    The patient has a history of early GDM in her last pregnancy.  Interestingly the pregnancy immediately preceding that one was complicated by a macrosomic  with hypoglycemia postnatally and thus she likely had GDM in that pregnancy.  In her last pregnancy she reports she was managed on insulin ultimately and had a an uncomplicated delivery.  She had a normal A1c early in this pregnancy.  She started checking her BG in the mid 2nd trimester and has had elevated values prompting her diagnosis if early GDM again in this pregnancy.    Prior to her appointment today she had been prescribed NPH 10 units before bedtime but was only able to take it three times due to not having pen needles.      10 point review of system is negative except as above    OB History  OB History    Para Term  AB Living   4 3 3 0 0 3   SAB IAB Ectopic Multiple Live Births   0 0 0 0 3      # Outcome Date GA Lbr Sanket/2nd Weight Sex Delivery Anes PTL Lv   4 Current            3 Term  39w0d  3997 g M    ARA      Complications: GDM, class A2   2 Term  39w0d  4848 g M CS-LTranv   ARA      Complications: Failure to Progress in First Stage   1 Term  39w0d  3997 g M Vag-Spont   ARA       Medical History  Past Medical History:   Diagnosis Date    Benign ovarian cyst 10/17/2023    DM (diabetes mellitus), gestational, postpartum condition 10/17/2023    Dyshidrosis (pompholyx) 07/10/2023    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    Social History  Social History     Tobacco Use    Smoking status: Never      Passive exposure: Never    Smokeless tobacco: Never   Substance Use Topics    Alcohol use: Never    Drug use: Never       Allergies  No Known Allergies    Medications:  Medication Documentation Review Audit       Reviewed by Jus Peña MD (Physician) on 12/11/23 at 1348      Medication Order Taking? Sig Documenting Provider Last Dose Status     Discontinued 12/11/23 1210   alcohol swabs pads, medicated [id number]  Use 1, up to 5 times a day Jus Peña MD  Active     Discontinued 12/11/23 1210   blood sugar diagnostic (OneTouch Ultra Test) strip [id number]  Use 1 strip 4-6 times per day during pregnancy Jus Peña MD  Active     Discontinued 12/07/23 1944     Discontinued 12/11/23 1046   blood-glucose meter misc [id number]  Test daily fasting and 1 hour after starting your meals. Jazmyn Medina MD  Active     Discontinued 12/11/23 1046     Discontinued 12/11/23 1210   FreeStyle Kassandra reader (FreeStyle Kassandra 2 [place]) misc [id number]  Use for monitoring with Libre2 sensor Jus Peña MD  Active     Discontinued 12/11/23 1210   FreeStyle Kassandra sensor system (FreeStyle Kassandra 2 Sensor) kit [id number]  Use 1 sensor and change every 14 days Jus Peña MD  Active     Discontinued 12/11/23 1047     Discontinued 12/11/23 1210   insulin NPH, Isophane, (HumuLIN N NPH Insulin KwikPen) 100 unit/mL (3 mL) injection [id number]  Inject 15 Units under the skin once daily at bedtime. Jus Peña MD  Active     Discontinued 12/11/23 1210   isopropyl alcohoL 70 % towelette [id number]  Test daily before all meals/snacks and once before bedtime. Jus Peña MD  Active     Discontinued 12/07/23 1944     Discontinued 12/11/23 1046     Discontinued 12/11/23 1210   lancets (OneTouch UltraSoft Lancets) misc [id number]  Use 1 lancet 4-6 times a day to test blood sugar during pregnancy Jus Peña MD  Active   metoclopramide (Reglan) 10 mg tablet [id number]  Take 1 tablet (10 mg) by mouth 1  "time if needed (nausea). Jazmyn Medina MD  Active   ondansetron (Zofran) 8 mg tablet [id number]  Take 1 tablet (8 mg) by mouth 2 times a day as needed for nausea. Nancy Mao MD  Active   pen needle, diabetic (Pen Needle) 32 gauge x \" needle [id number]  Use 1 per injection, up to 5 times a day Jus Peña MD  Active   Prenatal Vitamin 27 mg iron- 0.8 mg tablet [id number]  Take 1 tablet by mouth once daily. Historical Provider, MD  Active                    OBJECTIVE  Visit Vitals  /71   Wt 89.7 kg (197 lb 12.8 oz)   BMI 33.95 kg/m²   OB Status Pregnant   Smoking Status Never   BSA 2.01 m²       Physical exam  Gen: NAD  HEENT: EOMI, CN2-12 intact  Pulm: non-labored    ASSESSMENT & PLAN    Radha Haley is a 33 y.o.  at 21w4d here for the followin. A2DM  Patient was recently diagnosed with gestational diabetes (GDM).  We discussed the pregnancy implications of the diagnosis including increased risk of pre-eclampsia, LGA/macrosomia, shoulder dystocia, stillbirth as well as  hypoglycemia, hyperbilirubinemia and respiratory distress.  We reviewed the importance of glycemic control and its impact on lowering these risks.  Discussed management ranging from dietary changes to pharmacotherapy and that insulin is considered first line therapy rather than oral agents if medication is ultimately needed.  Discussed our recommendation for serial growth ultrasounds and starting  testing at 32 weeks if treatment is required.  We also stressed the potential persistence of impaired glucose tolerance post pregnancy in up to 30% of patients and 50% risk of IGT/T2DM in the next 10 years with an overall lifetime risk of T2DM of 70%. We reviewed the recommendation for postpartum screening at 6 weeks post-partum (can be performed as soon as 2 days after delivery) and, if normal, routine screening every 1-3 years. We did discuss that a healthy diet and maintenance of a normal body weight may " reduce those risks  - BG reviewed and fastings persistently elevated (improved but still above goal after starting 10 units NPH, had some more recent elevations in setting of illness with improving trend currently).  Will increase regimen to 15 units before bd.  - Expressed interest in CGM and so sensor device and reader sent to pharmacy.  Reviewed use of CGM and that current recommendations endorse their in use only in addition (rather than replace) capillary glucose.  Reviewed implications of different MARD and that our practice is still recommend capillary glucose levels 3 days/week in order to confirm reported values.    In summary the following is recommended:    1. We will continue to co-manage diabetes via the Bloodsugar line with weekly assessment of glycemic control.  Current regimen is: NPH 15 untis before bedtime.  2. We will plan for a follow up MD visit at 36 weeks to assess overall control and provide delivery timing recommendations.  3. Recommend serial growth ultrasounds every 4 weeks starting at 28 weeks gestation.  4. Weekly  testing is recommended starting at 32 weeks  Twice weekly testing is recommended at 32 weeks if control is suboptimal, there is polyhydramnios, or there is a LGA growth pattern.  5. Delivery is recommended at 39 weeks, though if glycemic control is suboptimal then delivery at 37-39 weeks may be considered.  6. If the EFW is >4500g at the time of delivery  should be considered.  7. A 2hr GTT is recommended 6 weeks postpartum (can be performed as soon as 2 days postpartum), if normal then screening for T2DM is recommended at least every 3 years.    Thank you for allowing us to participate in the care of your patient.     I spent 45 minutes in the professional and overall care of this patient.    Jus Peña MD  Maternal Fetal Medicine

## 2023-12-11 NOTE — TELEPHONE ENCOUNTER
Radha Haley called states all supplies except pen needles received by [facility] pharmacy.    Pen needles had been sent to [facility]    New Prescription for pen needles sent to pharmacy on record - [facility]

## 2023-12-11 NOTE — LETTER
2023     Jazmyn Medina MD  [address]  [place], [address]  [facility] [address]    Patient: Radha Haley   YOB: 1990   Date of Visit: 2023       Dear Dr. Jazmyn Medina MD:    Thank you for referring Radha Haley to me for evaluation. Below are my notes for this consultation.  If you have questions, please do not hesitate to call me. I look forward to following your patient along with you.       Sincerely,     Jus Peña MD      CC: No Recipients  ______________________________________________________________________________________    2023   Radha Haley     MFM CONSULT NOTE  Referring Clinician: Jazmyn Medina  Reason for consult: GDM    HPI: Radha Haley is a 33 y.o.  at 21w4d here for consult for A2DM.     Overall doing well.  Denies contractions, bleeding, or LOF. Reports normal fetal movement.    The patient has a history of early GDM in her last pregnancy.  Interestingly the pregnancy immediately preceding that one was complicated by a macrosomic  with hypoglycemia postnatally and thus she likely had GDM in that pregnancy.  In her last pregnancy she reports she was managed on insulin ultimately and had a an uncomplicated delivery.  She had a normal A1c early in this pregnancy.  She started checking her BG in the mid 2nd trimester and has had elevated values prompting her diagnosis if early GDM again in this pregnancy.    Prior to her appointment today she had been prescribed NPH 10 units before bedtime but was only able to take it three times due to not having pen needles.      10 point review of system is negative except as above    OB History  OB History    Para Term  AB Living   4 3 3 0 0 3   SAB IAB Ectopic Multiple Live Births   0 0 0 0 3      # Outcome Date GA Lbr Sanket/2nd Weight Sex Delivery Anes PTL Lv   4 Current            3 Term  39w0d  3997 g M    ARA      Complications: GDM, class A2   2 Term  39w0d  4848 g M  CS-LTranv   ARA      Complications: Failure to Progress in First Stage   1 Term  39w0d  3997 g M Vag-Spont   ARA       Medical History  Past Medical History:   Diagnosis Date   • Benign ovarian cyst 10/17/2023   • DM (diabetes mellitus), gestational, postpartum condition 10/17/2023   • Dyshidrosis (pompholyx) 07/10/2023   • Lichen simplex chronicus 07/10/2023       Surgical History  Past Surgical History:   Procedure Laterality Date   •  SECTION, LOW TRANSVERSE         Family History  family history is not on file.    Social History  Social History     Tobacco Use   • Smoking status: Never     Passive exposure: Never   • Smokeless tobacco: Never   Substance Use Topics   • Alcohol use: Never   • Drug use: Never       Allergies  No Known Allergies    Medications:  Medication Documentation Review Audit       Reviewed by Jus Peña MD (Physician) on 23 at 1348      Medication Order Taking? Sig Documenting Provider Last Dose Status     Discontinued 23 1210   alcohol swabs pads, medicated [id number]  Use 1, up to 5 times a day Jus Peña MD  Active     Discontinued 23 1210   blood sugar diagnostic (OneTouch Ultra Test) strip [id number]  Use 1 strip 4-6 times per day during pregnancy Jus Peña MD  Active     Discontinued 23 1944     Discontinued 23 1046   blood-glucose meter misc [id number]  Test daily fasting and 1 hour after starting your meals. Jazmyn Medina MD  Active     Discontinued 23 1046     Discontinued 23 1210   FreeStyle Kassandra reader (FreeStyle Kassandra 2 Omro) misc [id number]  Use for monitoring with Libre2 sensor Jus Peña MD  Active     Discontinued 23 1210   FreeStyle Kassandra sensor system (FreeStyle Kassandra 2 Sensor) kit [id number]  Use 1 sensor and change every 14 days Jus Peña MD  Active     Discontinued 23 1047     Discontinued 23 1210   insulin NPH, Isophane, (HumuLIN N NPH Insulin KwikPen) 100 unit/mL  "(3 mL) injection [id number]  Inject 15 Units under the skin once daily at bedtime. Jus Peña MD  Active     Discontinued 23 1210   isopropyl alcohoL 70 % towelette [id number]  Test daily before all meals/snacks and once before bedtime. Jus Peña MD  Active     Discontinued 23 1944     Discontinued 23 1046     Discontinued 23 1210   lancets (OneTouch UltraSoft Lancets) misc [id number]  Use 1 lancet 4-6 times a day to test blood sugar during pregnancy Jus Peña MD  Active   metoclopramide (Reglan) 10 mg tablet [id number]  Take 1 tablet (10 mg) by mouth 1 time if needed (nausea). Jazmyn Medina MD  Active   ondansetron (Zofran) 8 mg tablet [id number]  Take 1 tablet (8 mg) by mouth 2 times a day as needed for nausea. Nancy Mao MD  Active   pen needle, diabetic (Pen Needle) 32 gauge x \" needle [id number]  Use 1 per injection, up to 5 times a day Jus Peña MD  Active   Prenatal Vitamin 27 mg iron- 0.8 mg tablet [id number]  Take 1 tablet by mouth once daily. Historical Provider, MD  Active                    OBJECTIVE  Visit Vitals  /71   Wt 89.7 kg (197 lb 12.8 oz)   BMI 33.95 kg/m²   OB Status Pregnant   Smoking Status Never   BSA 2.01 m²       Physical exam  Gen: NAD  HEENT: EOMI, CN2-12 intact  Pulm: non-labored    ASSESSMENT & PLAN    Radha Haley is a 33 y.o.  at 21w4d here for the followin. A2DM  Patient was recently diagnosed with gestational diabetes (GDM).  We discussed the pregnancy implications of the diagnosis including increased risk of pre-eclampsia, LGA/macrosomia, shoulder dystocia, stillbirth as well as  hypoglycemia, hyperbilirubinemia and respiratory distress.  We reviewed the importance of glycemic control and its impact on lowering these risks.  Discussed management ranging from dietary changes to pharmacotherapy and that insulin is considered first line therapy rather than oral agents if medication is ultimately " needed.  Discussed our recommendation for serial growth ultrasounds and starting  testing at 32 weeks if treatment is required.  We also stressed the potential persistence of impaired glucose tolerance post pregnancy in up to 30% of patients and 50% risk of IGT/T2DM in the next 10 years with an overall lifetime risk of T2DM of 70%. We reviewed the recommendation for postpartum screening at 6 weeks post-partum (can be performed as soon as 2 days after delivery) and, if normal, routine screening every 1-3 years. We did discuss that a healthy diet and maintenance of a normal body weight may reduce those risks  - BG reviewed and fastings persistently elevated (improved but still above goal after starting 10 units NPH, had some more recent elevations in setting of illness with improving trend currently).  Will increase regimen to 15 units before bd.  - Expressed interest in CGM and so sensor device and reader sent to pharmacy.  Reviewed use of CGM and that current recommendations endorse their in use only in addition (rather than replace) capillary glucose.  Reviewed implications of different MARD and that our practice is still recommend capillary glucose levels 3 days/week in order to confirm reported values.    In summary the following is recommended:    1. We will continue to co-manage diabetes via the Bloodsugar line with weekly assessment of glycemic control.  Current regimen is: NPH 15 untis before bedtime.  2. We will plan for a follow up MD visit at 36 weeks to assess overall control and provide delivery timing recommendations.  3. Recommend serial growth ultrasounds every 4 weeks starting at 28 weeks gestation.  4. Weekly  testing is recommended starting at 32 weeks  Twice weekly testing is recommended at 32 weeks if control is suboptimal, there is polyhydramnios, or there is a LGA growth pattern.  5. Delivery is recommended at 39 weeks, though if glycemic control is suboptimal then delivery at  37-39 weeks may be considered.  6. If the EFW is >4500g at the time of delivery  should be considered.  7. A 2hr GTT is recommended 6 weeks postpartum (can be performed as soon as 2 days postpartum), if normal then screening for T2DM is recommended at least every 3 years.    Thank you for allowing us to participate in the care of your patient.     I spent 45 minutes in the professional and overall care of this patient.    Jus Peña MD  Maternal Fetal Medicine

## 2023-12-13 ENCOUNTER — TELEPHONE (OUTPATIENT)
Dept: MATERNAL FETAL MEDICINE | Facility: CLINIC | Age: 33
End: 2023-12-13
Payer: COMMERCIAL

## 2023-12-13 NOTE — TELEPHONE ENCOUNTER
Able to obtain CGM from pharmacy. Agreeable to do virtual visit for application and education. Scheduled for 12/15 @ 11:00, email invite to be sent.

## 2023-12-14 ENCOUNTER — ANCILLARY PROCEDURE (OUTPATIENT)
Dept: RADIOLOGY | Facility: CLINIC | Age: 33
End: 2023-12-14
Payer: COMMERCIAL

## 2023-12-14 ENCOUNTER — ROUTINE PRENATAL (OUTPATIENT)
Dept: OBSTETRICS AND GYNECOLOGY | Facility: CLINIC | Age: 33
End: 2023-12-14
Payer: COMMERCIAL

## 2023-12-14 VITALS — SYSTOLIC BLOOD PRESSURE: 110 MMHG | DIASTOLIC BLOOD PRESSURE: 60 MMHG | BODY MASS INDEX: 34.16 KG/M2 | WEIGHT: 199 LBS

## 2023-12-14 DIAGNOSIS — Z34.90 PREGNANT (HHS-HCC): ICD-10-CM

## 2023-12-14 DIAGNOSIS — Z3A.21 21 WEEKS GESTATION OF PREGNANCY (HHS-HCC): ICD-10-CM

## 2023-12-14 DIAGNOSIS — O24.419 GDM, CLASS A2 (HHS-HCC): ICD-10-CM

## 2023-12-14 PROCEDURE — 0501F PRENATAL FLOW SHEET: CPT | Performed by: OBSTETRICS & GYNECOLOGY

## 2023-12-14 PROCEDURE — 76816 OB US FOLLOW-UP PER FETUS: CPT | Performed by: OBSTETRICS & GYNECOLOGY

## 2023-12-14 PROCEDURE — 76816 OB US FOLLOW-UP PER FETUS: CPT

## 2023-12-15 ENCOUNTER — TELEPHONE (OUTPATIENT)
Dept: OBSTETRICS AND GYNECOLOGY | Facility: CLINIC | Age: 33
End: 2023-12-15
Payer: COMMERCIAL

## 2023-12-15 NOTE — TELEPHONE ENCOUNTER
Pt called with questions about new CGM. Had alarm overnight, sugar was 65 on sensor, 104 with finger stick. Review of CGM graph, 65 was 'sunk' under line graph, which is typical with compression lows. No symptoms of hypoglycemia at this time.  Pt also asking if she should hold insulin dose for bedtime sugar of 90 or lower. Since NPH action is to work overnight to assist with elevated fasting levels, she should routinely take insulin. No need to check before bed, although with CGM, she can see her sugar at anytime, there is no need to omit insulin based on pre-bed glucose level.  Pt states understanding.

## 2023-12-18 ENCOUNTER — PATIENT MESSAGE (OUTPATIENT)
Dept: MATERNAL FETAL MEDICINE | Facility: CLINIC | Age: 33
End: 2023-12-18
Payer: COMMERCIAL

## 2023-12-27 ENCOUNTER — TELEPHONE (OUTPATIENT)
Dept: MATERNAL FETAL MEDICINE | Facility: HOSPITAL | Age: 33
End: 2023-12-27
Payer: COMMERCIAL

## 2023-12-27 NOTE — TELEPHONE ENCOUNTER
Blood Sugar Support Line Communication   Communicated with the patient on 12/27/2023   She has Gestational Diabetes @ 23w6d    The patient checks her sugars fasting and 1 hour after meals. Her current regimen is as follows:  NPH inulin 15 At Bedtime      The patient's reported blood sugars appear well controlled, with 80% within the goal range. Goal range glucose is Fasting <95, 1 hr after meals <140.     After review & discussion No changes to her current treatment plan are indicated at this time.  We did discuss insulin injection site rotation and ability to swim with Kassandra CGM In place     Patient understands to submit sugar log for review weekly through the Blood Sugar Line @ [phone] or via email to [email or web address] to help optimize glucose control.    I spent approximately 8-10 minutes on the phone with the patient

## 2023-12-28 ENCOUNTER — TELEPHONE (OUTPATIENT)
Dept: OBSTETRICS AND GYNECOLOGY | Facility: CLINIC | Age: 33
End: 2023-12-28
Payer: COMMERCIAL

## 2024-01-02 ENCOUNTER — TELEPHONE (OUTPATIENT)
Dept: MATERNAL FETAL MEDICINE | Facility: CLINIC | Age: 34
End: 2024-01-02
Payer: COMMERCIAL

## 2024-01-02 NOTE — TELEPHONE ENCOUNTER
Patient wanted to let you know her kids tested positive for Shigella, had symptoms a couple weeks ago.  Patient states she had mild symptoms 3 weeks ago but feeling ok now, unsure if she needed to do anything additional.

## 2024-01-02 NOTE — TELEPHONE ENCOUNTER
Communicated with the patient on 1/2/2024  She has Gestational Diabetes @ 24w5d     The patient checks her sugars fasting and 1 hour after meals. Her current regimen is as follows:  NPH 20 at bedtime (self increase 3 nights ago)    Uses Kassandra CGM, had not worn sensor while waiting for replacement unit. Reported finger sticks as follows:      CGM report from past 2 days reviewed, elevations with dinner not appreciated. May need support with rapid acting insulin with dinner in the future. Fasting sugars decreasing since increase to 20 units.  No changes indicated today.    Patient understands to submit sugar log for review weekly through the Blood Sugar Line @ [phone] or via email to [email or web address] to help optimize glucose control.      Approximately 5 minutes was spent discussing the above information.

## 2024-01-08 ENCOUNTER — TELEPHONE (OUTPATIENT)
Dept: MATERNAL FETAL MEDICINE | Facility: CLINIC | Age: 34
End: 2024-01-08
Payer: COMMERCIAL

## 2024-01-08 DIAGNOSIS — O24.414 INSULIN CONTROLLED GESTATIONAL DIABETES MELLITUS (GDM) IN SECOND TRIMESTER (HHS-HCC): ICD-10-CM

## 2024-01-08 RX ORDER — BLOOD-GLUCOSE SENSOR
EACH MISCELLANEOUS
Qty: 3 EACH | Refills: 11 | Status: SHIPPED | OUTPATIENT
Start: 2024-01-08

## 2024-01-08 RX ORDER — LANCETS
EACH MISCELLANEOUS
Qty: 200 EACH | Refills: 3 | Status: SHIPPED | OUTPATIENT
Start: 2024-01-08

## 2024-01-08 NOTE — TELEPHONE ENCOUNTER
Communicated with the patient on 1/8/2024  She has Gestational Diabetes @ 25w4d     The patient checks her sugars fasting and 1 hour after meals. Her current regimen is as follows:   NPH 20 at bedtime          Also uses Kassandra CGM:        Noticing large discrepancies with finger sticks and CGM readings. This has been appreciated with multiple different sensors. Discussed delayed CGM values while comparing readings taken at same time. Overall CGM readings are significantly lower than the finger sticks.   Can try different CGM brand. Will place order for Dexcom and see if insurance will approve.  If not will likely discontinue CGM, as she does not want to have a false sense of security, and prefers to error on the side of caution.    No changes to her regimen indicated at the time.    Patient understands to submit sugar log for review weekly through the Blood Sugar Line @ [phone] or via email to [email or web address] to help optimize glucose control.    Approximately 8 minutes was spent discussing the above information.

## 2024-01-11 ENCOUNTER — APPOINTMENT (OUTPATIENT)
Dept: OBSTETRICS AND GYNECOLOGY | Facility: CLINIC | Age: 34
End: 2024-01-11
Payer: COMMERCIAL

## 2024-01-15 ENCOUNTER — LAB (OUTPATIENT)
Dept: LAB | Facility: LAB | Age: 34
End: 2024-01-15
Payer: COMMERCIAL

## 2024-01-15 ENCOUNTER — TELEPHONE (OUTPATIENT)
Dept: MATERNAL FETAL MEDICINE | Facility: HOSPITAL | Age: 34
End: 2024-01-15

## 2024-01-15 ENCOUNTER — PATIENT MESSAGE (OUTPATIENT)
Dept: MATERNAL FETAL MEDICINE | Facility: HOSPITAL | Age: 34
End: 2024-01-15

## 2024-01-15 ENCOUNTER — ROUTINE PRENATAL (OUTPATIENT)
Dept: OBSTETRICS AND GYNECOLOGY | Facility: CLINIC | Age: 34
End: 2024-01-15
Payer: COMMERCIAL

## 2024-01-15 VITALS — BODY MASS INDEX: 34.36 KG/M2 | DIASTOLIC BLOOD PRESSURE: 74 MMHG | SYSTOLIC BLOOD PRESSURE: 110 MMHG | WEIGHT: 200.2 LBS

## 2024-01-15 DIAGNOSIS — O24.419 GESTATIONAL DIABETES MELLITUS, CLASS A2 (HHS-HCC): ICD-10-CM

## 2024-01-15 DIAGNOSIS — L30.9 ECZEMA, UNSPECIFIED TYPE: Primary | ICD-10-CM

## 2024-01-15 DIAGNOSIS — Z3A.25 25 WEEKS GESTATION OF PREGNANCY (HHS-HCC): ICD-10-CM

## 2024-01-15 DIAGNOSIS — R21 SKIN RASH: ICD-10-CM

## 2024-01-15 LAB
ERYTHROCYTE [DISTWIDTH] IN BLOOD BY AUTOMATED COUNT: 15.1 % (ref 11.5–14.5)
HCT VFR BLD AUTO: 29.8 % (ref 36–46)
HGB BLD-MCNC: 10.2 G/DL (ref 12–16)
MCH RBC QN AUTO: 32.5 PG (ref 26–34)
MCHC RBC AUTO-ENTMCNC: 34.2 G/DL (ref 32–36)
MCV RBC AUTO: 95 FL (ref 80–100)
NRBC BLD-RTO: 0 /100 WBCS (ref 0–0)
PLATELET # BLD AUTO: 194 X10*3/UL (ref 150–450)
RBC # BLD AUTO: 3.14 X10*6/UL (ref 4–5.2)
WBC # BLD AUTO: 7.6 X10*3/UL (ref 4.4–11.3)

## 2024-01-15 PROCEDURE — 82607 VITAMIN B-12: CPT

## 2024-01-15 PROCEDURE — 36415 COLL VENOUS BLD VENIPUNCTURE: CPT

## 2024-01-15 PROCEDURE — 86780 TREPONEMA PALLIDUM: CPT

## 2024-01-15 PROCEDURE — 87102 FUNGUS ISOLATION CULTURE: CPT | Performed by: OBSTETRICS & GYNECOLOGY

## 2024-01-15 PROCEDURE — 82306 VITAMIN D 25 HYDROXY: CPT

## 2024-01-15 PROCEDURE — 82746 ASSAY OF FOLIC ACID SERUM: CPT

## 2024-01-15 PROCEDURE — 85027 COMPLETE CBC AUTOMATED: CPT

## 2024-01-15 PROCEDURE — 83550 IRON BINDING TEST: CPT

## 2024-01-15 PROCEDURE — 0501F PRENATAL FLOW SHEET: CPT | Performed by: OBSTETRICS & GYNECOLOGY

## 2024-01-15 PROCEDURE — 82728 ASSAY OF FERRITIN: CPT

## 2024-01-15 RX ORDER — TRIAMCINOLONE ACETONIDE 1 MG/G
OINTMENT TOPICAL 2 TIMES DAILY
Qty: 60 G | Refills: 2 | Status: SHIPPED | OUTPATIENT
Start: 2024-01-15

## 2024-01-15 ASSESSMENT — EDINBURGH POSTNATAL DEPRESSION SCALE (EPDS)
I HAVE BEEN SO UNHAPPY THAT I HAVE HAD DIFFICULTY SLEEPING: NOT AT ALL
THINGS HAVE BEEN GETTING ON TOP OF ME: NO, MOST OF THE TIME I HAVE COPED QUITE WELL
I HAVE LOOKED FORWARD WITH ENJOYMENT TO THINGS: AS MUCH AS I EVER DID
I HAVE BEEN SO UNHAPPY THAT I HAVE BEEN CRYING: NO, NEVER
I HAVE BEEN ANXIOUS OR WORRIED FOR NO GOOD REASON: NO, NOT AT ALL
TOTAL SCORE: 3
THE THOUGHT OF HARMING MYSELF HAS OCCURRED TO ME: NEVER
I HAVE BLAMED MYSELF UNNECESSARILY WHEN THINGS WENT WRONG: NOT VERY OFTEN
I HAVE FELT SAD OR MISERABLE: NOT VERY OFTEN
I HAVE FELT SCARED OR PANICKY FOR NO GOOD REASON: NO, NOT AT ALL
I HAVE BEEN ABLE TO LAUGH AND SEE THE FUNNY SIDE OF THINGS: AS MUCH AS I ALWAYS COULD

## 2024-01-15 NOTE — PROGRESS NOTES
GDMA2 based on early abnormal glucose checks: on NPH 20U at bedtime and notes this past week, BSG has been higher and also all over the place. Next growth at 28 weeks. Audelia Laila will contact pt today.  2nd tri labs ordered  Inner thigh pruritus. She tried OTC Monistat without relief. Yeast swab collected. Looks like eczema to me. Topical steroid ointment ordered.  Was planning to go to [facility], would be back by March 2024. Risks/benefits of travel discussed. Permission granted to travel.    Discussed delivery preferences again and added to the chart.   Pt nervous about a new hospital practice where her own OB might not necessarly be delivering.    Jazmyn Medina MD

## 2024-01-15 NOTE — TELEPHONE ENCOUNTER
Radha Haley called to clarify concern r/t discrepancy among Fingerstick blood glucose monitoring (numbers not match food intake) as we;ll as between Fingerstick blood glucose monitoring & CGM.    Radha Haley verified BGM strips not  or damaged.  Has stopped Kassandra CGM plans to begin Dexcom CGM this evening.  Down loaded carrie while on the phone with me for both Dexcom CGM and Clarity for data share. Feels comfortable initiating Dexcom CGM without assist.    Radha Haley continues to be very concerned that BGM is elevated.  Want to increase insulin to improve.    Discussed that increasing NPH inulin At Bedtime creates risk for Hypoglycemia  when FBS are Within goal range Fasting <95,     Will add NPH inulin 4 Before breakfast to provide support for BGM At lunch and At dinner.  States eating time at breakfast varies greatly.  Lunch & dinner predictable.  Agrees to begin NPH inulin 4 units @ 8-9am.      Will sent Dexcom CGM data share instruction both email and patient message.  Encouraged to contact the Blood Sugar Support Line for questions or concerns       I spent approximately 8-10 minutes on the phone with the patient   discussing the above information.

## 2024-01-16 DIAGNOSIS — O99.012 ANEMIA AFFECTING PREGNANCY IN SECOND TRIMESTER (HHS-HCC): Primary | ICD-10-CM

## 2024-01-16 LAB
25(OH)D3 SERPL-MCNC: 25 NG/ML (ref 30–100)
FERRITIN SERPL-MCNC: 127 NG/ML
FOLATE SERPL-MCNC: 22.2 NG/ML
IRON SATN MFR SERPL: NORMAL %
IRON SERPL-MCNC: 98 UG/DL
REFLEX ADDED, ANEMIA PANEL: NORMAL
TIBC SERPL-MCNC: NORMAL UG/DL
TREPONEMA PALLIDUM IGG+IGM AB [PRESENCE] IN SERUM OR PLASMA BY IMMUNOASSAY: NONREACTIVE
UIBC SERPL-MCNC: >450 UG/DL
VIT B12 SERPL-MCNC: 321 PG/ML

## 2024-01-22 ENCOUNTER — TELEPHONE (OUTPATIENT)
Dept: MATERNAL FETAL MEDICINE | Facility: CLINIC | Age: 34
End: 2024-01-22
Payer: COMMERCIAL

## 2024-01-22 ENCOUNTER — HOSPITAL ENCOUNTER (OUTPATIENT)
Dept: RADIOLOGY | Facility: CLINIC | Age: 34
Discharge: HOME | End: 2024-01-22
Payer: COMMERCIAL

## 2024-01-22 DIAGNOSIS — Z34.90 PREGNANT (HHS-HCC): ICD-10-CM

## 2024-01-22 PROCEDURE — 76816 OB US FOLLOW-UP PER FETUS: CPT | Performed by: OBSTETRICS & GYNECOLOGY

## 2024-01-22 PROCEDURE — 76819 FETAL BIOPHYS PROFIL W/O NST: CPT | Performed by: OBSTETRICS & GYNECOLOGY

## 2024-01-22 PROCEDURE — 76816 OB US FOLLOW-UP PER FETUS: CPT

## 2024-01-22 NOTE — TELEPHONE ENCOUNTER
Communicated with the patient on 1/22/2024  She has Gestational Diabetes @ 27w4d     The patient checks her sugars fasting and 1 hour after meals, and reports them as follows:      She also uses a Dexcom G7 CGM, report as follows:      The patient's regimen was changed, as discussed with CHANDNI Pablo,to:   NPH 6/20 --> 6/24* at bedtime    Patient understands to submit sugar log for review weekly through the Blood Sugar Line @ [phone] or via email to [email or web address] to help optimize glucose control.    Approximately 10 minutes was spent discussing the above information.

## 2024-01-29 ENCOUNTER — PATIENT MESSAGE (OUTPATIENT)
Dept: MATERNAL FETAL MEDICINE | Facility: CLINIC | Age: 34
End: 2024-01-29
Payer: COMMERCIAL

## 2024-01-29 DIAGNOSIS — O24.414 INSULIN CONTROLLED GESTATIONAL DIABETES MELLITUS (GDM) DURING PREGNANCY, ANTEPARTUM (HHS-HCC): ICD-10-CM

## 2024-01-29 DIAGNOSIS — O24.414 INSULIN CONTROLLED GESTATIONAL DIABETES MELLITUS (GDM) IN SECOND TRIMESTER (HHS-HCC): ICD-10-CM

## 2024-01-29 RX ORDER — PEN NEEDLE, DIABETIC 30 GX3/16"
NEEDLE, DISPOSABLE MISCELLANEOUS
Qty: 100 EACH | Refills: 11 | Status: SHIPPED | OUTPATIENT
Start: 2024-01-29

## 2024-02-05 ENCOUNTER — TELEPHONE (OUTPATIENT)
Dept: OBSTETRICS AND GYNECOLOGY | Facility: CLINIC | Age: 34
End: 2024-02-05
Payer: COMMERCIAL

## 2024-02-05 DIAGNOSIS — O24.414 INSULIN CONTROLLED GESTATIONAL DIABETES MELLITUS (GDM) IN SECOND TRIMESTER (HHS-HCC): ICD-10-CM

## 2024-02-05 LAB
FUNGUS SPEC CULT: NORMAL
FUNGUS SPEC FUNGUS STN: NORMAL

## 2024-02-05 RX ORDER — INSULIN LISPRO 100 [IU]/ML
INJECTION, SOLUTION INTRAVENOUS; SUBCUTANEOUS
Qty: 5 EACH | Refills: 3 | Status: SHIPPED | OUTPATIENT
Start: 2024-02-05 | End: 2024-03-05 | Stop reason: SDUPTHER

## 2024-02-05 NOTE — TELEPHONE ENCOUNTER
Communicated with the patient on 2/5/2024  She has Gestational Diabetes @ 29w4d     The patient checks her sugars fasting and 1 hour after meals, and reports them as follows:        The patient's regimen was changed, as discussed with CHANDNI Pablo,to:   NPH 6/24 --> 6/28* am/pm  Lispro 4* with breakfast only    Education was provided on rapid acting insulin, including; proper timing, peak effectiveness, and that it is clear in color.     Pt plans on international travel later this week.  Travel tip provided:   · Whenever possible, bring prescription labels for medication and medical devices (while not required by TSA, making them available will make the security process go more quickly)   · Pack medications in a separate clear, sealable bag. Bags that are placed in carry-on luggage need to be removed and  from other belongings for screening   · Keep a quick acting source of glucose to treat low blood glucose as well as an easy to carry snack such as a nutrition bar, crackers or dried fruit    · Carry or wear medical identification and carry contact information for physician   · Pack extra supplies such test strips, lancets, medication or syringes   · Ensure refills are available @ home pharmacy in event run out or need replacement   · Protect insulin from extremes of temperature, refrigeration not necessary     PA submitted for increase quantity per month for test strips.    Patient understands to submit sugar log for review weekly through the Blood Sugar Line @ [phone] or via email to [email or web address] to help optimize glucose control.      Approximately 15 minutes was spent discussing the above information.

## 2024-02-07 ENCOUNTER — LAB (OUTPATIENT)
Dept: LAB | Facility: LAB | Age: 34
End: 2024-02-07
Payer: COMMERCIAL

## 2024-02-07 ENCOUNTER — ROUTINE PRENATAL (OUTPATIENT)
Dept: OBSTETRICS AND GYNECOLOGY | Facility: CLINIC | Age: 34
End: 2024-02-07
Payer: COMMERCIAL

## 2024-02-07 VITALS — DIASTOLIC BLOOD PRESSURE: 68 MMHG | WEIGHT: 200.6 LBS | SYSTOLIC BLOOD PRESSURE: 107 MMHG | BODY MASS INDEX: 34.43 KG/M2

## 2024-02-07 DIAGNOSIS — N85.A UTERINE SCAR FROM PREVIOUS CESAREAN DELIVERY: ICD-10-CM

## 2024-02-07 DIAGNOSIS — O24.419 GDM, CLASS A2 (HHS-HCC): Primary | ICD-10-CM

## 2024-02-07 DIAGNOSIS — O99.213 OBESITY AFFECTING PREGNANCY IN THIRD TRIMESTER, UNSPECIFIED OBESITY TYPE (HHS-HCC): ICD-10-CM

## 2024-02-07 DIAGNOSIS — O09.41 HIGH RISK MULTIGRAVIDA, FIRST TRIMESTER (HHS-HCC): ICD-10-CM

## 2024-02-07 DIAGNOSIS — Z23 NEED FOR TDAP VACCINATION: ICD-10-CM

## 2024-02-07 DIAGNOSIS — O99.012 ANEMIA AFFECTING PREGNANCY IN SECOND TRIMESTER (HHS-HCC): ICD-10-CM

## 2024-02-07 PROBLEM — E66.811 CLASS 1 OBESITY WITH BODY MASS INDEX (BMI) OF 33.0 TO 33.9 IN ADULT: Status: RESOLVED | Noted: 2023-10-17 | Resolved: 2024-02-07

## 2024-02-07 PROBLEM — Z3A.21 21 WEEKS GESTATION OF PREGNANCY (HHS-HCC): Status: RESOLVED | Noted: 2023-12-11 | Resolved: 2024-02-07

## 2024-02-07 PROBLEM — O09.43 HIGH RISK MULTIGRAVIDA IN THIRD TRIMESTER (HHS-HCC): Status: ACTIVE | Noted: 2023-10-17

## 2024-02-07 PROBLEM — O21.9 NAUSEA AND VOMITING IN PREGNANCY (HHS-HCC): Status: RESOLVED | Noted: 2023-10-17 | Resolved: 2024-02-07

## 2024-02-07 PROBLEM — E66.9 CLASS 1 OBESITY WITH BODY MASS INDEX (BMI) OF 33.0 TO 33.9 IN ADULT: Status: RESOLVED | Noted: 2023-10-17 | Resolved: 2024-02-07

## 2024-02-07 LAB
ERYTHROCYTE [DISTWIDTH] IN BLOOD BY AUTOMATED COUNT: 14.7 % (ref 11.5–14.5)
FERRITIN SERPL-MCNC: 75 NG/ML
HCT VFR BLD AUTO: 28.9 % (ref 36–46)
HGB BLD-MCNC: 10 G/DL (ref 12–16)
IRON SATN MFR SERPL: 17 % (ref 25–45)
IRON SERPL-MCNC: 91 UG/DL (ref 35–150)
MCH RBC QN AUTO: 31.8 PG (ref 26–34)
MCHC RBC AUTO-ENTMCNC: 34.6 G/DL (ref 32–36)
MCV RBC AUTO: 92 FL (ref 80–100)
NRBC BLD-RTO: 0 /100 WBCS (ref 0–0)
PLATELET # BLD AUTO: 156 X10*3/UL (ref 150–450)
RBC # BLD AUTO: 3.14 X10*6/UL (ref 4–5.2)
TIBC SERPL-MCNC: 540 UG/DL (ref 240–445)
TSH SERPL-ACNC: 0.71 MIU/L (ref 0.44–3.98)
UIBC SERPL-MCNC: 449 UG/DL (ref 110–370)
WBC # BLD AUTO: 6.5 X10*3/UL (ref 4.4–11.3)

## 2024-02-07 PROCEDURE — 83540 ASSAY OF IRON: CPT

## 2024-02-07 PROCEDURE — 83550 IRON BINDING TEST: CPT

## 2024-02-07 PROCEDURE — 85027 COMPLETE CBC AUTOMATED: CPT

## 2024-02-07 PROCEDURE — 90715 TDAP VACCINE 7 YRS/> IM: CPT | Performed by: OBSTETRICS & GYNECOLOGY

## 2024-02-07 PROCEDURE — 82728 ASSAY OF FERRITIN: CPT

## 2024-02-07 PROCEDURE — 59425 ANTEPARTUM CARE ONLY: CPT | Performed by: OBSTETRICS & GYNECOLOGY

## 2024-02-07 PROCEDURE — 84443 ASSAY THYROID STIM HORMONE: CPT

## 2024-02-07 PROCEDURE — 36415 COLL VENOUS BLD VENIPUNCTURE: CPT

## 2024-02-07 NOTE — PROGRESS NOTES
GDMA2: on NPH 6/28, Lispro 4 with breakfast. Using POCT, CGM was just consistently off. Growth 1/22 = AC at the 98% and the EFW at the 92% , VALERIA 25. Next growth 2/22. She does note that she always has big babies, even when she didn't have DM, and that babies in her family are also large.   2nd tri labs notable for anemia Hgb 10.2 MCV 95 with nl ferritin 127. Recommend repeating studies this month.  Inner thigh pruritus: yeast swab neg. Steroid ointment has been helpful.  Tdap today    Leaving town for [place] tomorrow, coming back Feb 20th.    Jazmyn Medina MD

## 2024-02-08 ENCOUNTER — TELEPHONE (OUTPATIENT)
Dept: OBSTETRICS AND GYNECOLOGY | Facility: CLINIC | Age: 34
End: 2024-02-08
Payer: COMMERCIAL

## 2024-02-09 ENCOUNTER — TELEPHONE (OUTPATIENT)
Dept: OBSTETRICS AND GYNECOLOGY | Facility: CLINIC | Age: 34
End: 2024-02-09
Payer: COMMERCIAL

## 2024-02-09 ENCOUNTER — TELEPHONE (OUTPATIENT)
Dept: RADIOLOGY | Facility: CLINIC | Age: 34
End: 2024-02-09
Payer: COMMERCIAL

## 2024-02-09 DIAGNOSIS — O99.019: Primary | ICD-10-CM

## 2024-02-09 NOTE — TELEPHONE ENCOUNTER
Called the patient and discussed her lab results and let her know there was a suspicion for Thalassemia and she had been referred to hematology. Let her know they should reach out to her but if they don't or if the appointment is not scheduled soon to give us a call.

## 2024-02-09 NOTE — TELEPHONE ENCOUNTER
Spoke the patient and let her know that a referral was placed in the system to hematology and that she should be seen before the end of the pregnancy. Let her know Dr. Medina needs to sign the order before she can schedule.   ----- Message from Jazmyn Medina MD sent at 2/8/2024 10:51 PM EST -----  I suspect Radha may have alpha thalassemia. She is anemic with poor iron sat but her actual ferritin levels are good. I think she should see hematology for management. It IS important that her Hgb does not continue to drop. Can you place that referral?

## 2024-02-09 NOTE — TELEPHONE ENCOUNTER
Contacted pt and verified name and .  Pt notified per Dr. Mao that she is ok to return to Dr. Medina for any other issues or concerns.  Pt states understanding denies having questions at this time.

## 2024-02-12 ENCOUNTER — TELEPHONE (OUTPATIENT)
Dept: MATERNAL FETAL MEDICINE | Facility: HOSPITAL | Age: 34
End: 2024-02-12
Payer: COMMERCIAL

## 2024-02-12 NOTE — TELEPHONE ENCOUNTER
Blood Sugar Support Line Communication   Communicated with the patient on 1/9/2024   She has Gestational Diabetes 30w4d    At the time of the call her diabetes was treated with:  NPH inulin before breakfast and before bed  6/28  Has nor started Lispro Insulin at breakfast - it was ordered in anticipation of out of country travel needs.  BGM Within goal range 80% or more Fasting <95, 1 hr after meals <140.  Most After meal     The patient checks her sugars and reports them as follows:      The patient's regimen was changed to:   No change.  Is traveling out of country, [place], again later this week for a week.  States reviewed travel instruction with RN previously.  No questions or concerns.  Discussed can increase NPH inulin At Bedtime 4 units if FBS return to consistently > 95.  Otherwise No change    Patient understands to submit sugar log for review weekly through the Blood Sugar Line @ [phone] or via email to [email or web address] to help optimize glucose control.    I spent approximately 8-10 minutes on the phone with the patient

## 2024-02-13 ENCOUNTER — TELEPHONE (OUTPATIENT)
Dept: OBSTETRICS AND GYNECOLOGY | Facility: CLINIC | Age: 34
End: 2024-02-13
Payer: COMMERCIAL

## 2024-02-13 NOTE — TELEPHONE ENCOUNTER
Patient needed letter saying ok to fly. Dr. Medina aware of patient's plan to travel. Letter sent via [employer].

## 2024-02-22 ENCOUNTER — HOSPITAL ENCOUNTER (OUTPATIENT)
Dept: RADIOLOGY | Facility: CLINIC | Age: 34
Discharge: HOME | End: 2024-02-22
Payer: COMMERCIAL

## 2024-02-22 DIAGNOSIS — Z34.90 PREGNANT (HHS-HCC): ICD-10-CM

## 2024-02-22 PROCEDURE — 76816 OB US FOLLOW-UP PER FETUS: CPT

## 2024-02-22 PROCEDURE — 76819 FETAL BIOPHYS PROFIL W/O NST: CPT | Performed by: STUDENT IN AN ORGANIZED HEALTH CARE EDUCATION/TRAINING PROGRAM

## 2024-02-22 PROCEDURE — 76816 OB US FOLLOW-UP PER FETUS: CPT | Performed by: STUDENT IN AN ORGANIZED HEALTH CARE EDUCATION/TRAINING PROGRAM

## 2024-02-26 ENCOUNTER — TELEPHONE (OUTPATIENT)
Dept: MATERNAL FETAL MEDICINE | Facility: CLINIC | Age: 34
End: 2024-02-26
Payer: COMMERCIAL

## 2024-02-26 NOTE — TELEPHONE ENCOUNTER
Communicated with the patient on 2/26/2024  She has Gestational Diabetes @ 32w4d     The patient checks her sugars fasting and 1 hour after meals, and reports them as follows:        Was taking 4 Lispro at breakfast while on vacation. Since being home (1 week) she has not needed the breakfast dose. Dinner levels have remained elevated, even after back in normal routine. Will add mealtime support dose with dinner and keep breakfast dose as needed (high carb meals).    The patient's regimen was changed, as discussed with CHANDNI Pablo,to:   NPH 6/28 am/pm  Lispro 4(PRN) /-/ 4* with breakfast and dinner    Patient understands to submit sugar log for review weekly through the Blood Sugar Line @ [phone] or via email to [email or web address] to help optimize glucose control.    Approximately 5 minutes was spent discussing the above information.

## 2024-03-04 ENCOUNTER — PATIENT MESSAGE (OUTPATIENT)
Dept: MATERNAL FETAL MEDICINE | Facility: CLINIC | Age: 34
End: 2024-03-04
Payer: COMMERCIAL

## 2024-03-04 DIAGNOSIS — O24.414 INSULIN CONTROLLED GESTATIONAL DIABETES MELLITUS (GDM) IN SECOND TRIMESTER (HHS-HCC): ICD-10-CM

## 2024-03-04 RX ORDER — BLOOD SUGAR DIAGNOSTIC
STRIP MISCELLANEOUS
Qty: 150 EACH | Refills: 3 | Status: SHIPPED | OUTPATIENT
Start: 2024-03-04 | End: 2024-04-07 | Stop reason: HOSPADM

## 2024-03-05 DIAGNOSIS — O24.414 INSULIN CONTROLLED GESTATIONAL DIABETES MELLITUS (GDM) IN SECOND TRIMESTER (HHS-HCC): ICD-10-CM

## 2024-03-05 RX ORDER — INSULIN HUMAN 100 [IU]/ML
INJECTION, SUSPENSION SUBCUTANEOUS
Qty: 5 EACH | Refills: 3 | Status: SHIPPED | OUTPATIENT
Start: 2024-03-05 | End: 2024-04-07 | Stop reason: HOSPADM

## 2024-03-05 RX ORDER — INSULIN LISPRO 100 [IU]/ML
INJECTION, SOLUTION INTRAVENOUS; SUBCUTANEOUS
Qty: 5 EACH | Refills: 3 | Status: SHIPPED | OUTPATIENT
Start: 2024-03-05 | End: 2024-03-08 | Stop reason: SDUPTHER

## 2024-03-08 ENCOUNTER — PATIENT MESSAGE (OUTPATIENT)
Dept: MATERNAL FETAL MEDICINE | Facility: HOSPITAL | Age: 34
End: 2024-03-08
Payer: COMMERCIAL

## 2024-03-08 DIAGNOSIS — O24.414 INSULIN CONTROLLED GESTATIONAL DIABETES MELLITUS (GDM) IN SECOND TRIMESTER (HHS-HCC): ICD-10-CM

## 2024-03-08 RX ORDER — INSULIN LISPRO 100 [IU]/ML
INJECTION, SOLUTION INTRAVENOUS; SUBCUTANEOUS
Qty: 5 EACH | Refills: 3 | Status: SHIPPED | OUTPATIENT
Start: 2024-03-08 | End: 2024-04-07 | Stop reason: HOSPADM

## 2024-03-11 ENCOUNTER — TELEPHONE (OUTPATIENT)
Dept: MATERNAL FETAL MEDICINE | Facility: HOSPITAL | Age: 34
End: 2024-03-11
Payer: COMMERCIAL

## 2024-03-11 NOTE — TELEPHONE ENCOUNTER
Blood Sugar Support Line Communication   Communicated with the patient on 1/9/2024   She has Gestational Diabetes 34w4d      The patient checks her sugars and reports them as follows:      The patient's regimen was changed to:   NPH inulin before breakfast and before bed  10/32 -> 14/32  Lispro Insulin - prn 4 / - / 8 -> 4 prn /-/ 14    Patient understands to submit sugar log for review weekly through the Blood Sugar Line @ [phone] or via email to [email or web address] to help optimize glucose control.    I spent approximately 4-6 minutes on the phone with the patient

## 2024-03-12 NOTE — PROGRESS NOTES
The patient reports some FM and is w/o sx of PTL or PEC. She was recently seen by MFM. She has had some difficulty obtaining all of her diabetic supplies.     A/P: HROB - Diabetes, obesity    -  F/U with MFM for additional supplies    -  Patient to call in glucose values     -  Recheck CBC and syphilis at next vist/24 weeks

## 2024-03-13 ENCOUNTER — PROCEDURE VISIT (OUTPATIENT)
Dept: OBSTETRICS AND GYNECOLOGY | Facility: CLINIC | Age: 34
End: 2024-03-13
Payer: COMMERCIAL

## 2024-03-13 ENCOUNTER — LAB (OUTPATIENT)
Dept: LAB | Facility: LAB | Age: 34
End: 2024-03-13
Payer: COMMERCIAL

## 2024-03-13 ENCOUNTER — ROUTINE PRENATAL (OUTPATIENT)
Dept: OBSTETRICS AND GYNECOLOGY | Facility: CLINIC | Age: 34
End: 2024-03-13
Payer: COMMERCIAL

## 2024-03-13 VITALS — SYSTOLIC BLOOD PRESSURE: 123 MMHG | DIASTOLIC BLOOD PRESSURE: 75 MMHG | BODY MASS INDEX: 34.54 KG/M2 | WEIGHT: 201.2 LBS

## 2024-03-13 DIAGNOSIS — O99.213 OBESITY AFFECTING PREGNANCY IN THIRD TRIMESTER, UNSPECIFIED OBESITY TYPE (HHS-HCC): ICD-10-CM

## 2024-03-13 DIAGNOSIS — O99.013 ANEMIA AFFECTING PREGNANCY IN THIRD TRIMESTER (HHS-HCC): ICD-10-CM

## 2024-03-13 DIAGNOSIS — N85.A UTERINE SCAR FROM PREVIOUS CESAREAN DELIVERY: ICD-10-CM

## 2024-03-13 DIAGNOSIS — O09.43 HIGH RISK MULTIGRAVIDA IN THIRD TRIMESTER (HHS-HCC): Primary | ICD-10-CM

## 2024-03-13 DIAGNOSIS — O24.419 GDM, CLASS A2 (HHS-HCC): ICD-10-CM

## 2024-03-13 LAB
ERYTHROCYTE [DISTWIDTH] IN BLOOD BY AUTOMATED COUNT: 15.2 % (ref 11.5–14.5)
FERRITIN SERPL-MCNC: 42 NG/ML
HCT VFR BLD AUTO: 29.4 % (ref 36–46)
HGB BLD-MCNC: 10.2 G/DL (ref 12–16)
HGB RETIC QN: 36 PG (ref 28–38)
IMMATURE RETIC FRACTION: 22.6 %
IRON SATN MFR SERPL: 26 %
IRON SERPL-MCNC: 153 UG/DL
MCH RBC QN AUTO: 32.4 PG (ref 26–34)
MCHC RBC AUTO-ENTMCNC: 34.7 G/DL (ref 32–36)
MCV RBC AUTO: 93 FL (ref 80–100)
NRBC BLD-RTO: 0 /100 WBCS (ref 0–0)
PLATELET # BLD AUTO: 148 X10*3/UL (ref 150–450)
RBC # BLD AUTO: 3.15 X10*6/UL (ref 4–5.2)
RETICS #: 0.11 X10*6/UL (ref 0.02–0.08)
RETICS/RBC NFR AUTO: 3.4 % (ref 0.5–2)
TIBC SERPL-MCNC: 599 UG/DL
UIBC SERPL-MCNC: 446 UG/DL
WBC # BLD AUTO: 7.1 X10*3/UL (ref 4.4–11.3)

## 2024-03-13 PROCEDURE — 59025 FETAL NON-STRESS TEST: CPT | Performed by: OBSTETRICS & GYNECOLOGY

## 2024-03-13 PROCEDURE — 82728 ASSAY OF FERRITIN: CPT

## 2024-03-13 PROCEDURE — 85045 AUTOMATED RETICULOCYTE COUNT: CPT

## 2024-03-13 PROCEDURE — 83550 IRON BINDING TEST: CPT

## 2024-03-13 PROCEDURE — 99214 OFFICE O/P EST MOD 30 MIN: CPT | Mod: TH | Performed by: OBSTETRICS & GYNECOLOGY

## 2024-03-13 PROCEDURE — 36415 COLL VENOUS BLD VENIPUNCTURE: CPT

## 2024-03-13 PROCEDURE — 82607 VITAMIN B-12: CPT

## 2024-03-13 PROCEDURE — 82746 ASSAY OF FOLIC ACID SERUM: CPT

## 2024-03-13 PROCEDURE — 85027 COMPLETE CBC AUTOMATED: CPT

## 2024-03-13 PROCEDURE — 99214 OFFICE O/P EST MOD 30 MIN: CPT | Performed by: OBSTETRICS & GYNECOLOGY

## 2024-03-13 NOTE — PROGRESS NOTES
GDMA2  - following with DM Line  - On NPH , Lispro   - Last growth : EFW at the 99%ile and the AC > 99%ile , nl fluid. Next 3/21.  - Twice weekly surveillance at 36 weeks, currently weekly (NST today)  Repeat anemia labs today. Pt has occ dizziness, but not sure if just related to GDM or pregnancy. Pt has Heme apptmt 3/28.    Reviewed birth plan. Overall reasonable though pt was warned that she may not be able to get into water or have wireless monitoring if the equipment is not available. She plans to labor without an epidural since none of her prior 3 have worked, and desires a spinal or CSE if she need a CS. She wants to defer Hep B vaccine for baby until the postpartum peds apptmt.     Pt strongly desires to .  She does not feel that this baby is larger than the baby she had a CS for (who was ~4.9kg at birth).  We talked about how delivery timing depends on whether there is concern that her GDM is not well controlled. MFM will make that decision around 36-37 weeks for her. She is very reluctant to be delivered at 37 weeks and wants to wait until her 38th week. Discussed with her that while no one can force her to be induced, the medical recommendations made will be based off of our expert opinion and what we think is safest for baby.    NST for GDMA2    FHT: 125, mod byron, + accel, - decel  [place]ah-ta-hey: none  Interpretation: reactive.    Jazmyn Medina MD

## 2024-03-13 NOTE — PATIENT INSTRUCTIONS
DR. MEDINA'S DELIVERY GUIDE    HOW SHOULD I PREPARE?  Think about what you want your delivery to be like and let your team know  Don't hesitate to speak up if you have concerns or questions  Stay mentally flexible - even with modern medicine, delivery can be unpredictable!  Make sure you have reliable and timely transportation to the hospital  Keep your gas tank at least ¼ full  Keep a towel in the car (to catch amniotic fluid if your water breaks)  Plan for childcare, weather-related traffic delays, etc.  Hospital bag suggestions: birth plan, phone chargers, personal toiletries, hair accessories, chewing gum, water bottle, personal pillow or blanket, things to help you relax during labor, change of clothes for postpartum, nursing bra, eye mask and ear plugs, flip flops for shower, personal towel for shower, baby clothes for going home (bring 1 size up and down from expected size)    WHEN SHOULD I CALL?  When your water breaks (can feel like a gush, or a non-stop trickle of fluid)  When your contractions have been happening regularly for at least 2 hours non-stop AND  First Delivery: Contractions are occurring every 5 minutes or less  Repeat Delivery/Planned : Contractions are occurring every 8 minutes or less  If the baby is moving less than usual   If you experience bleeding like a period  For any other symptoms that just doesn't feel “right” to you    WHO DO I CALL?  Call [phone] for the [facility] office and [phone] for the [place] office. During the day, ask the  to direct you to the office nurse. After hours, you will be directed to the doctor on Labor & Delivery by the answering service. Please make sure to tell the answering service which L&D location you plan to go to so they can contact the correct on call provider.    WHERE DO I DELIVER?  Low risk pregnancies have the option to deliver at any  hospital. High risk pregnancies should deliver at [facility]. Dr. Medina  only delivers at [facility], typically on Thursday nights. To schedule a hospital tour, call [phone].    HOW DO I GET THERE?  [facility] [place] [facility]: [address], [place]. This will take you to the entrance of [facility], which connects to [facility]. The parking garage is also closest to this address, and there is a  as well. Walk through [facility] towards the atrium, then [facility] will be on your right just past the [employer] shop. Labor and Delivery is located on the 2nd floor via elevator.   [facility]  [place] at [facility]: [address]. Entrance is located between the Emergency Department and the Main Hospital building. Look for the [employer] logo above the entrance. You can also enter from the entrance to the [facility] where there is a  - just turn right past the information desk and walk down the gallery hendrix until you reach [facility]. Labor and Delivery is located on the 3rd floor via elevator.    WHO CAN BE WITH ME?  The visitor policy can be found online at [email or web address].   Certified doulas do not count as visitors.     HOW CAN I DECREASE MY RISK FOR  DELIVERY?  Keep your body as strong and healthy as possible  Look through the [employer]® website to understand how movement can help your baby get in the right place in the pelvis   Use professional birth support, such as a    Change positions frequently during labor  Stay well hydrated throughout your labor, including “clears” for caloric nourishment (honey water, apple juice, etc.)   Allow enough time for labor - it can take over 24 hours!  Rest when you can so you are mentally ready to push once you get to 10cm dilated  Try pushing in a different position if you're not making progress. Consider pushing on hands and knees, closed  knee pushing, use of a birthing bar, etc.     Delivery is a team effort. Please speak up if you don't feel included in the decision-making.       DR. PIPER'S POSTPARTUM GUIDE    Going through all the physical and emotional changes of pregnancy is no small feat, and it's important to realize these changes continue even after delivery. Only about 50% of women go to their postpartum visits, which means a lot of women are missing out on an opportunity to check on their own health, receive support, and ask important questions. Every woman should have a check-up 4-6 weeks after their delivery, and some women will need to be seen even sooner than that.    We highly encourage you to take your postpartum care just as seriously as your prenatal care. Many serious long-term health issues related to pregnancy actually happen postpartum. Our goal is to help you feel supported, capable, and safe as you navigate your unique post-pregnancy journey.    SAFETY  You should immediately contact the doctor's office if you experience any of the following:  Chest pain or trouble breathing  Blood pressures >150/100, that is just as high or higher when repeated 20 minutes later  Severe abdominal pain not responding to your discharge pain medications  Heavy bleeding fully soaking a pad in under 30 minutes or soiling your clothes   Chills, shakes, or fever >100.4°F  Suicidal thoughts  You should also call anytime you just don't feel right - it is always better to be safe than sorry!    COMMON POSTPARTUM CONCERNS  A variety of other postpartum issues are just as harmful to a woman's wellbeing and health, even if they are not deadly. Please call for an appointment if you have concerns about the following, or any other bothersome issue:  Breastfeeding difficulties  Mood changes such as depression or anxiety  Pain during sex or with activity  Abnormal uterine bleeding past 6-8 weeks postpartum  You are not alone, and we are here to help  you!    WHAT TO EXPECT AT YOUR POSTPARTUM VISIT  During your postpartum visit, we will ask you questions about your pregnancy complications, postpartum mood and support, infant feeding journey, contraceptive plans, and more. If your concerns require a more in-depth evaluation, we may ask you to come back for a follow-up visit. Certain follow-up visits may be billed outside of your insurance's OB global package.     POSTPARTUM SUPPORT RESOURCES    [facility] POSTPARTUM PROGRAMMING  [email or web address]-[facility]/patient-resources/classes-and-support   [phone]  Free parenting support offered via “Mommy and Baby Too!” peer groups and WIC-lead “Healthy Mom and Baby” programs.    [facility] LACTATION SUPPORT  [email or web address]/health-[email or web address]/[email or web address]/[email or web address]-services  [phone]  In person and virtual appointments offered at multiple locations for breastfeeding support.    [facility]  https://[email or web address]  [phone]. Provides full spectrum breastfeeding assistance. Located in the [facility] building, but open to parents seeing other pediatricians.    [facility] PELVIC FLOOR PHYSICAL THERAPY  https://[email or web address]/services/obgy-[facility]/female-pelvic-health/conditions-and-treatments/[email or web address]-[email or web address]  [phone]  Licensed female therapists help with a variety of postpartum pelvic floor concerns, including pain, weakness, incontinence, and more. Referral may be required.     [facility] WOMEN'S MENTAL HEALTH CLINIC  [email or web address]://[email or web address]/conditions-[email or web address]/[email or web address]-health   [phone], ask for “Women's Mental Health” providers for help with postpartum anxiety, depression, OCD, PTSD, and more.    HELP ME GROW  http://www.helpmegrow.[place].gov/  Help Me Grow is an evidence-based program that promotes healthy  growth and development for babies and young children by providing professional support in the home for pregnant mothers or new parents. You can self-refer through the website or ask your doctor to make a referral.    LOOKING FOR PROFESSIONAL POSTPARTUM SUPPORT?  [employer] ( Certifying Organization)  https://www.reji.org/  Type in your zip code to find a certified postpartum  near you    [employer]  http://www.[email or web address]/    THE [facility]  http://www.CRAVE.Q.ME/    [employer]   [email or web address]

## 2024-03-14 LAB
FERRITIN SERPL-MCNC: 42 NG/ML
FOLATE SERPL-MCNC: >24 NG/ML
IRON SATN MFR SERPL: 26 %
IRON SERPL-MCNC: 154 UG/DL
REFLEX ADDED, ANEMIA PANEL: NORMAL
TIBC SERPL-MCNC: 601 UG/DL
UIBC SERPL-MCNC: 447 UG/DL
VIT B12 SERPL-MCNC: 286 PG/ML

## 2024-03-18 ENCOUNTER — PATIENT MESSAGE (OUTPATIENT)
Dept: MATERNAL FETAL MEDICINE | Facility: HOSPITAL | Age: 34
End: 2024-03-18
Payer: COMMERCIAL

## 2024-03-21 ENCOUNTER — HOSPITAL ENCOUNTER (OUTPATIENT)
Dept: RADIOLOGY | Facility: CLINIC | Age: 34
Discharge: HOME | End: 2024-03-21
Payer: COMMERCIAL

## 2024-03-21 DIAGNOSIS — Z34.90 PREGNANT (HHS-HCC): ICD-10-CM

## 2024-03-21 PROCEDURE — 76819 FETAL BIOPHYS PROFIL W/O NST: CPT

## 2024-03-21 PROCEDURE — 76816 OB US FOLLOW-UP PER FETUS: CPT

## 2024-03-21 PROCEDURE — 76819 FETAL BIOPHYS PROFIL W/O NST: CPT | Performed by: STUDENT IN AN ORGANIZED HEALTH CARE EDUCATION/TRAINING PROGRAM

## 2024-03-21 PROCEDURE — 76816 OB US FOLLOW-UP PER FETUS: CPT | Performed by: STUDENT IN AN ORGANIZED HEALTH CARE EDUCATION/TRAINING PROGRAM

## 2024-03-24 NOTE — PROGRESS NOTES
"[facility]    PATIENT VISIT INFORMATION    Visit Type: New Visit    Referring Provider: Jazmyn Medina MD  Reason for referral: Anemia in pregnancy   Primary Practice Provider: Betty Mart MD    HEMATOLOGY HISTORY    Anemia started 1/15/2024 hemoglobin 10.2, 12 321.    2024 hemoglobin of 10.  Referring physician felt alpha thalassemia may be playing a role.  Iron 10% TSAT, spike in TIBC and UIBC, and ferritin 42.    3/ hemoglobin of 10.2.  B12 286.  3/ hgb 10.5, platelets 133, iron no Tsat, ferritin 40, albumin 3.3, calcium 8.3, and total protein 5.8.   HISTORY OF PRESENT ILLNESS     ID Statement: Radha Haley is a 33 year old female     Chief Complaint: Anemia Evaluation in pregnancy     Interval History:   Radha presents for evaluation of anemia in pregnancy.  She states she feels well and has no symptoms than what she considers \"normal pregnancy symptoms.\"  Some minor fatigue and once in a while fast heartbeat. She does note a Shigella infection 2-3 months ago when the whole household was ill with the bacterial infection. Diarrhea at that time. Resolved. Scheduled to be induced next Friday. On insulin for gestational diabetes.     Denies fever, night sweats, shortness of breath, chest pain, abdominal pain, abnormal stools, no D/N/V/C, blood in stool or melena, no urinary concerns, no neuropathy, swelling, bleeding from any location, no bruising, no lymphadenopathy, rashes, or pain.     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     OCCUPATIONAL/ENVIRONMENTAL HISTORY/EXPOSURES:  -None reported    Active Problems, Allergy List, Medication List, and PMH/PSH/FH/Social Hx have been reviewed and reconciled in chart. Updates made when necessary.     REVIEW OF SYSTEMS   A review of systems has been completed and are negative for complaints except what is stated in the assessment, HPI, IH, ROS, and/or past medical history.    ALLERGIES AND MEDICATIONS     Allergies and Intolerances:   No Known Allergies   Medication Profile:   Current Outpatient Medications   Medication Instructions    alcohol swabs pads, medicated Use 1, up to 5 times a day    blood sugar diagnostic (OneTouch Ultra Test) strip Use 1 strip 4-6 times per day during pregnancy    blood-glucose meter misc Test daily fasting and 1 hour after starting your meals.    blood-glucose sensor (Dexcom G7 Sensor) device Use 1 sensor and change every 10 days    cyanocobalamin (VITAMIN B-12) 1,000 mcg, oral, Daily    ferrous sulfate, 325 mg ferrous sulfate, tablet 1 tablet, oral, Daily with breakfast    FreeStyle Kassandra reader (FreeStyle Kassandra 2 [place]) misc Use for monitoring with Libre2 sensor    FreeStyle Kassandra sensor system (FreeStyle Kassandra 2 Sensor) kit Use 1 sensor and change every 14 days    insulin lispro (HumaLOG KwikPen Insulin) 100 unit/mL injection Inject 4 units before breakfast daily PRN and 8 units At dinner .    insulin NPH, Isophane, (HumuLIN N NPH Insulin KwikPen) 100 unit/mL (3 mL) injection Inject 10 at breakfast and 32 At Bedtime . May take up to 50 units BID during pregnancy    isopropyl alcohoL 70 % towelette Test daily before all meals/snacks and once before bedtime.    lancets (OneTouch UltraSoft Lancets) misc Use 1 lancet 4-6 times a day to test blood sugar during pregnancy     "metoclopramide (REGLAN) 10 mg, oral, Once as needed    pen needle, diabetic 32 gauge x 5/32\" needle Use one for ea injection    Prenatal Vitamin 27 mg iron- 0.8 mg tablet 1 tablet, oral, Daily    triamcinolone (Kenalog) 0.1 % ointment Topical, 2 times daily      Available Vaccination Record:   Immunization History   Administered Date(s) Administered    Tdap vaccine, age 7 year and older (BOOSTRIX, ADACEL) 02/07/2024      PHYSICAL EXAM     Vital Signs/Measurements:       12/14/2023     3:10 PM 1/15/2024     2:00 PM 2/7/2024     9:00 AM 3/13/2024     8:00 AM 3/25/2024    12:00 PM 3/27/2024     9:00 AM 3/28/2024     9:00 AM   Vitals   Systolic 110 110 107 123 108 111 96   Diastolic 60 74 68 75 70 67 63   Heart Rate       91   Temp       36.3 °C (97.3 °F)   Resp       16   Height (in)       1.641 m (5' 4.61\")    Weight (lb) 199 200.2 200.6 201.2  202.4 199.74   BMI 34.16 kg/m2 34.36 kg/m2 34.43 kg/m2 34.54 kg/m2  34.74 kg/m2 33.64 kg/m2   BSA (m2) 2.02 m2 2.02 m2 2.03 m2 2.03 m2  2.04 m2 2.03 m2   Visit Report       Report       Significant value        Performance:   ECOG Performance Status: 0     Grade ECOG performance status   0 Fully active, able to carry on all pre-disease performance without restriction   1 Restricted in physically strenuous activity but ambulatory and able to carry out work of a light or sedentary nature, e.g., light housework, office work   2 Ambulatory and capable of all selfcare but unable to carry out any work activities; Up and about more than 50% of waking hours   3 Capable of only limited selfcare, confined to bed or chair more than 50% of waking hours   4 Completely disabled; Cannot carry out any selfcare; Totally confined to bed or chair   5 Dead     Physical Exam:  General: Patient is pregnant female awake/alert/oriented x3, no distress, Nourished, hydrated, alert and cooperative, ambulating without difficulty  Skin: Expected color for ethnicity, good turgor, dry, no prominent lesions, " rashes, unusual bruising, or bleeding   Hair: Normal texture and distribution   Nails: Normal color, no deformities    HEENT:   Head: Normocephalic, atraumatic, no visible or palpable masses, depressions, or scarring   Eyes: Conjunctiva clear, sclera non-icteric, no exudates or hemorrhages   Ears: nl appearance, hearing intact    Nose: no external lesions, mucosa non-inflamed, no rhinorrhea   Mouth: Mucous membranes moist, no lesions, sores, bleeding, or erythema     Head/Neck: Neck supple, no apparent injury, thyroid without mass or tenderness, No JVD, trachea midline, no bruits appreciated    Respiratory/Thorax: Patent airways, CTAB, chest symmetry, normal inspiratory and expiratory effort    Cardiovascular: Regular rate and rhythm, no murmur or gallop, no carotid bruit or thrills    Gastrointestinal: Bowel sounds normal   Genitourinary: deferred   Musculoskeletal: Normal gait, normal range of motion, no deformity, normal strength for baseline, no atrophy   Extremities: No amputations or deformities, cyanosis, edema or viscosities, peripheral pulses intact   Neurological: Sensation present to touch, intact senses, motor response and reflexes normal, normal strength   Breast:   Lymphatic: No significant lymphadenopathy   Psychological: Intact recent and remote memory, judgement, and insight. Appropriate mood, affect, and behavior        RESULTS/DATA     Labs:   Lab Results   Component Value Date    WBC 7.1 03/28/2024    NEUTROABS 5.57 03/28/2024    IGABSOL 0.02 03/28/2024    LYMPHSABS 1.10 (L) 03/28/2024    MONOSABS 0.37 03/28/2024    EOSABS 0.04 03/28/2024    BASOSABS 0.02 03/28/2024    RBC 3.27 (L) 03/28/2024    MCV 94 03/28/2024    MCHC 34.3 03/28/2024    HGB 10.5 (L) 03/28/2024    HCT 30.6 (L) 03/28/2024     (L) 03/28/2024     Lab Results   Component Value Date    RETICCTPCT 3.4 (H) 03/28/2024      Lab Results   Component Value Date    CREATININE 0.51 03/28/2024    BUN 10 03/28/2024    EGFR >90  03/28/2024     03/28/2024    K 3.6 03/28/2024     03/28/2024    CO2 22 03/28/2024      Lab Results   Component Value Date    ALT 10 03/28/2024    AST 12 03/28/2024    ALKPHOS 95 03/28/2024    BILITOT 0.5 03/28/2024      Lab Results   Component Value Date    TSH 0.71 02/07/2024     Lab Results   Component Value Date    TSH 0.71 02/07/2024     Lab Results   Component Value Date    IRON 114 03/28/2024    TIBC  03/28/2024      Comment:      One or more of the analytes used in this calculation is outside of the analytical measurement range.      FERRITIN 40 03/28/2024     Lab Results   Component Value Date     03/28/2024        Radiology/Studies:   -NA    ASSESSMENT/PLAN     Assessment and Plan:   #1. Anemia in Pregnancy   Anemia started 1/15/2024 hemoglobin 10.2 and  B12 321.  Hemoglobin today 10.5 platelets 133 no other abnormality on CBC.  Albumin and total protein low calcium 8.3 kidney function good.  Corrected reticulocyte count 2.5.  Iron panel showing no saturation, UIBC over 450 and TIBC unable to be calculated.  Ferritin is 40.  Notable iron deficiency anemia in pregnancy. Secondly, she had a gastrointestinal infection and is gestational diabetic.  Insulin dependent. Bilirubin direct and LDH normal.  Direct antiglobulin test normal.  Haptoglobin, hemoglobin identification, and homocystine in process.     IV iron infusion scheduled.  Though likely will not benefit due to delivery scheduled in a week.  She will take oral iron and B12 daily if tolerates.  Likely she will need IV iron infusions following birth of baby.  She will follow-up after birth of baby and we will schedule IV iron infusions as needed. PreCert for Venofer in process.     **Please follow with specialties as scheduled for other comorbidities and routine health screenings.**    I have reviewed the patient's medical record including provider notes, laboratory and testing results, imaging, and procedures available within the  system and outside the system pertinent to patient care.     Follow up:    RTC:  -5 week after birth of baby  -Iron Venofer infusion to be scheduled     Medications:  -Ferrous sulfate 325 mg daily sent to pharmacy  -B12 1000 mg sent to pharmacy   -Venofer to be scheduled    Imaging/Testing:  -NANCY    Referral:  -NANCY    Other Pertinent Appointments:  -OBGYN 4/3/2024      Patient Discussion Summary:  Discussed plan of care in detail. Patient states understanding and in agreement. Answered all questions. They will call with any additional questions and/or concerns.    Thank you for allowing me to participate in your care. It was a pleasure meeting you.    Sincerely,  Soledad Whitfield, APRN-CNP       This document may have been written by voice recognition software.  Please request clarification if there is documentation error or clarification is needed.   Time based billing: Please see documentation within this specific encounter.

## 2024-03-25 ENCOUNTER — PROCEDURE VISIT (OUTPATIENT)
Dept: OBSTETRICS AND GYNECOLOGY | Facility: CLINIC | Age: 34
End: 2024-03-25
Payer: COMMERCIAL

## 2024-03-25 VITALS — SYSTOLIC BLOOD PRESSURE: 108 MMHG | DIASTOLIC BLOOD PRESSURE: 70 MMHG

## 2024-03-25 DIAGNOSIS — O24.419 GDM, CLASS A2 (HHS-HCC): ICD-10-CM

## 2024-03-25 DIAGNOSIS — Z3A.36 36 WEEKS GESTATION OF PREGNANCY (HHS-HCC): Primary | ICD-10-CM

## 2024-03-25 PROCEDURE — 59025 FETAL NON-STRESS TEST: CPT | Performed by: OBSTETRICS & GYNECOLOGY

## 2024-03-25 NOTE — PROCEDURES
Radha Haley, a  at 36w4d with an BURAK of 2024, by Ultrasound, was seen at [facility] for a nonstress test.    Non-Stress Test   Baseline Fetal Heart Rate for Non-Stress Test: 140 BPM  Variability in Waveform for Non-Stress Test: Moderate  Accelerations in Non-Stress Test: Yes, lasting at least 15 seconds  Decelerations in Non-Stress Test: None  Contractions in Non-Stress Test: Not present  Interpretation of Non-Stress Test   Interpretation of Non-Stress Test: Reactive

## 2024-03-27 ENCOUNTER — ROUTINE PRENATAL (OUTPATIENT)
Dept: MATERNAL FETAL MEDICINE | Facility: CLINIC | Age: 34
End: 2024-03-27
Payer: COMMERCIAL

## 2024-03-27 ENCOUNTER — PROCEDURE VISIT (OUTPATIENT)
Dept: OBSTETRICS AND GYNECOLOGY | Facility: CLINIC | Age: 34
End: 2024-03-27
Payer: COMMERCIAL

## 2024-03-27 VITALS — BODY MASS INDEX: 34.74 KG/M2 | WEIGHT: 202.4 LBS | DIASTOLIC BLOOD PRESSURE: 67 MMHG | SYSTOLIC BLOOD PRESSURE: 111 MMHG

## 2024-03-27 DIAGNOSIS — O24.419 GDM, CLASS A2 (HHS-HCC): Primary | ICD-10-CM

## 2024-03-27 DIAGNOSIS — O24.419 GDM, CLASS A2 (HHS-HCC): ICD-10-CM

## 2024-03-27 DIAGNOSIS — N85.A UTERINE SCAR FROM PREVIOUS CESAREAN DELIVERY: ICD-10-CM

## 2024-03-27 DIAGNOSIS — Z3A.36 36 WEEKS GESTATION OF PREGNANCY (HHS-HCC): Primary | ICD-10-CM

## 2024-03-27 PROCEDURE — 59025 FETAL NON-STRESS TEST: CPT | Performed by: OBSTETRICS & GYNECOLOGY

## 2024-03-27 PROCEDURE — 87081 CULTURE SCREEN ONLY: CPT | Performed by: OBSTETRICS & GYNECOLOGY

## 2024-03-27 PROCEDURE — 99214 OFFICE O/P EST MOD 30 MIN: CPT | Performed by: OBSTETRICS & GYNECOLOGY

## 2024-03-27 NOTE — PROGRESS NOTES
3/27/2024   Radha Haley       HPI: Radha Haley is a 33 y.o.  at 36w6d here for f/u consult for A2DM.     Overall doing well.  Denies contractions, bleeding, or LOF. Reports normal fetal movement.    Has been checking BG regularly and following up remotely.  BG reviewed and fasting within goal though did have low BG this AM.  PP values mostly at goal though having some elevations the last several days with dinner.    OBJECTIVE  Visit Vitals  /67   Wt 91.8 kg (202 lb 6.4 oz)   BMI 34.74 kg/m²   OB Status Pregnant   Smoking Status Never   BSA 2.04 m²       Physical exam  Gen: NAD  HEENT: EOMI, CN2-12 intact  Pulm: non-labored    NST: reactive see procedure note.    ASSESSMENT & PLAN    Radha Haley is a 33 y.o.  at 36w6d here for the followin. A2DM  - BG reviewed and will decrease NPH from 18-->18/30.  Currently on lispro 4 as needed with breakfast and 14 units with dinner.  If dinner elevated this evening will increase to 16 units with dinner.  - Reviewed recent growth >99%ile with EFW 3841g with normal amniotic fluid.  - We discussed delivery timing at great length today.  I reviewed the ACOG recommendation for delivery at [id number] for A2DM that is uncomplicated and consideration of delivery at [id number] for complicated A2DM.   I reviewed there is no consensus on what constitutes complicated A2DM, but my practice is to incorporate chronic glucose control, concurrent co morbidities as well as fetal growth and surveillance.  I reviewed that her growth ultrasound last week would suggest an EFW ~4000g c/w fetal macrosomia at term.  I discussed that I typically consider delivery at 37 weeks for suspected fetal macrosomia, though understanding the limitations of ultrasound to assess fetal weight.  She does desire to defer delivery as long as possible.  We reviewed her family history of macrosomia over several generations in individuals without GDM as well as her personal history: she had a  ~4000 gm infant born in the absence of GDM, followed by 4800gm infant (who had hypoglycemia following delivery and suspect undiagnosed GDM), followed by 4000gm infant at 39 weeks in the setting of well controlled GDM.  I noted that given her individual history I suspect the current fetal growth pattern is driven at least in part, by consitutional LGA as her glucose control has been excellent.  Given this, I do think delivery at 38 weeks is reasonable with twice weekly fetal testing until delivery.  She is in agreement with this plan.  Will schedule delivery at 38w0d.  Based on current estimates would estimate EFW <4500gm at time of delivery.  Risks of shoulder dystocia reviewed.    Thank you for allowing us to participate in the care of your patient.     I spent 45 minutes in the professional and overall care of this patient.    Jus Peña MD  Maternal Fetal Medicine

## 2024-03-27 NOTE — PROCEDURES
Radha Haley, a  at 36w6d with an BURAK of 2024, by [facility]trasound[facility]    Non-Stress Test   Baseline Fetal Heart Rate for Non-Stress Test: 130 BPM  Variability in Waveform for Non-Stress Test: Moderate  Accelerations in Non-Stress Test: Yes  Decelerations in Non-Stress Test: None  Contractions in Non-Stress Test: Irregular  Acoustic Stimulator for Non-Stress Test: No  Interpretation of Non-Stress Test   Interpretation of Non-Stress Test: Reactive

## 2024-03-28 ENCOUNTER — OFFICE VISIT (OUTPATIENT)
Dept: HEMATOLOGY/ONCOLOGY | Facility: CLINIC | Age: 34
End: 2024-03-28
Payer: COMMERCIAL

## 2024-03-28 VITALS
TEMPERATURE: 97.3 F | HEART RATE: 91 BPM | SYSTOLIC BLOOD PRESSURE: 96 MMHG | DIASTOLIC BLOOD PRESSURE: 63 MMHG | RESPIRATION RATE: 16 BRPM | OXYGEN SATURATION: 96 % | WEIGHT: 199.74 LBS | BODY MASS INDEX: 33.28 KG/M2 | HEIGHT: 65 IN

## 2024-03-28 DIAGNOSIS — O99.013 ANEMIA DURING PREGNANCY IN THIRD TRIMESTER (HHS-HCC): ICD-10-CM

## 2024-03-28 DIAGNOSIS — O99.013 ANEMIA AFFECTING PREGNANCY IN THIRD TRIMESTER (HHS-HCC): ICD-10-CM

## 2024-03-28 DIAGNOSIS — O09.43 HIGH RISK MULTIGRAVIDA IN THIRD TRIMESTER (HHS-HCC): Primary | ICD-10-CM

## 2024-03-28 DIAGNOSIS — O99.019: ICD-10-CM

## 2024-03-28 LAB
ALBUMIN SERPL BCP-MCNC: 3.3 G/DL (ref 3.4–5)
ALP SERPL-CCNC: 95 U/L (ref 33–110)
ALT SERPL W P-5'-P-CCNC: 10 U/L (ref 7–45)
ANION GAP SERPL CALC-SCNC: 13 MMOL/L (ref 10–20)
AST SERPL W P-5'-P-CCNC: 12 U/L (ref 9–39)
BASOPHILS # BLD AUTO: 0.02 X10*3/UL (ref 0–0.1)
BASOPHILS NFR BLD AUTO: 0.3 %
BILIRUB DIRECT SERPL-MCNC: 0.1 MG/DL (ref 0–0.3)
BILIRUB SERPL-MCNC: 0.5 MG/DL (ref 0–1.2)
BUN SERPL-MCNC: 10 MG/DL (ref 6–23)
CALCIUM SERPL-MCNC: 8.3 MG/DL (ref 8.6–10.3)
CHLORIDE SERPL-SCNC: 105 MMOL/L (ref 98–107)
CO2 SERPL-SCNC: 22 MMOL/L (ref 21–32)
CREAT SERPL-MCNC: 0.51 MG/DL (ref 0.5–1.05)
DAT-POLYSPECIFIC: NORMAL
EGFRCR SERPLBLD CKD-EPI 2021: >90 ML/MIN/1.73M*2
EOSINOPHIL # BLD AUTO: 0.04 X10*3/UL (ref 0–0.7)
EOSINOPHIL NFR BLD AUTO: 0.6 %
ERYTHROCYTE [DISTWIDTH] IN BLOOD BY AUTOMATED COUNT: 14.9 % (ref 11.5–14.5)
FERRITIN SERPL-MCNC: 40 NG/ML (ref 8–150)
GLUCOSE SERPL-MCNC: 122 MG/DL (ref 74–99)
HCT VFR BLD AUTO: 30.6 % (ref 36–46)
HCYS SERPL-SCNC: 6.62 UMOL/L (ref 5–13.9)
HGB BLD-MCNC: 10.5 G/DL (ref 12–16)
HGB RETIC QN: 36 PG (ref 28–38)
IMM GRANULOCYTES # BLD AUTO: 0.02 X10*3/UL (ref 0–0.7)
IMM GRANULOCYTES NFR BLD AUTO: 0.3 % (ref 0–0.9)
IMMATURE RETIC FRACTION: 26 %
IRON SATN MFR SERPL: ABNORMAL %
IRON SERPL-MCNC: 114 UG/DL (ref 35–150)
LDH SERPL L TO P-CCNC: 120 U/L (ref 84–246)
LYMPHOCYTES # BLD AUTO: 1.1 X10*3/UL (ref 1.2–4.8)
LYMPHOCYTES NFR BLD AUTO: 15.4 %
MCH RBC QN AUTO: 32.1 PG (ref 26–34)
MCHC RBC AUTO-ENTMCNC: 34.3 G/DL (ref 32–36)
MCV RBC AUTO: 94 FL (ref 80–100)
MONOCYTES # BLD AUTO: 0.37 X10*3/UL (ref 0.1–1)
MONOCYTES NFR BLD AUTO: 5.2 %
NEUTROPHILS # BLD AUTO: 5.57 X10*3/UL (ref 1.2–7.7)
NEUTROPHILS NFR BLD AUTO: 78.2 %
NRBC BLD-RTO: ABNORMAL /100{WBCS}
PLATELET # BLD AUTO: 133 X10*3/UL (ref 150–450)
POTASSIUM SERPL-SCNC: 3.6 MMOL/L (ref 3.5–5.3)
PROT SERPL-MCNC: 5.8 G/DL (ref 6.4–8.2)
RBC # BLD AUTO: 3.27 X10*6/UL (ref 4–5.2)
RETICS #: 0.11 X10*6/UL (ref 0.02–0.08)
RETICS/RBC NFR AUTO: 3.4 % (ref 0.5–2)
SODIUM SERPL-SCNC: 136 MMOL/L (ref 136–145)
TIBC SERPL-MCNC: ABNORMAL UG/DL
UIBC SERPL-MCNC: >450 UG/DL (ref 110–370)
WBC # BLD AUTO: 7.1 X10*3/UL (ref 4.4–11.3)

## 2024-03-28 PROCEDURE — 3078F DIAST BP <80 MM HG: CPT

## 2024-03-28 PROCEDURE — 82728 ASSAY OF FERRITIN: CPT

## 2024-03-28 PROCEDURE — 83021 HEMOGLOBIN CHROMOTOGRAPHY: CPT | Mod: GEALAB

## 2024-03-28 PROCEDURE — 83090 ASSAY OF HOMOCYSTEINE: CPT | Mod: GEALAB

## 2024-03-28 PROCEDURE — 80053 COMPREHEN METABOLIC PANEL: CPT

## 2024-03-28 PROCEDURE — 3074F SYST BP LT 130 MM HG: CPT

## 2024-03-28 PROCEDURE — 99205 OFFICE O/P NEW HI 60 MIN: CPT

## 2024-03-28 PROCEDURE — 83020 HEMOGLOBIN ELECTROPHORESIS: CPT

## 2024-03-28 PROCEDURE — 83615 LACTATE (LD) (LDH) ENZYME: CPT

## 2024-03-28 PROCEDURE — 85045 AUTOMATED RETICULOCYTE COUNT: CPT

## 2024-03-28 PROCEDURE — 86880 COOMBS TEST DIRECT: CPT

## 2024-03-28 PROCEDURE — 3008F BODY MASS INDEX DOCD: CPT

## 2024-03-28 PROCEDURE — 83540 ASSAY OF IRON: CPT

## 2024-03-28 PROCEDURE — 99215 OFFICE O/P EST HI 40 MIN: CPT

## 2024-03-28 PROCEDURE — 36415 COLL VENOUS BLD VENIPUNCTURE: CPT

## 2024-03-28 PROCEDURE — 82248 BILIRUBIN DIRECT: CPT

## 2024-03-28 PROCEDURE — 83010 ASSAY OF HAPTOGLOBIN QUANT: CPT

## 2024-03-28 PROCEDURE — 85025 COMPLETE CBC W/AUTO DIFF WBC: CPT

## 2024-03-28 RX ORDER — ALBUTEROL SULFATE 0.83 MG/ML
3 SOLUTION RESPIRATORY (INHALATION) AS NEEDED
Status: CANCELLED | OUTPATIENT
Start: 2024-03-29

## 2024-03-28 RX ORDER — DIPHENHYDRAMINE HYDROCHLORIDE 50 MG/ML
50 INJECTION INTRAMUSCULAR; INTRAVENOUS AS NEEDED
Status: CANCELLED | OUTPATIENT
Start: 2024-03-29

## 2024-03-28 RX ORDER — HEPARIN SODIUM,PORCINE/PF 10 UNIT/ML
50 SYRINGE (ML) INTRAVENOUS AS NEEDED
OUTPATIENT
Start: 2024-03-29

## 2024-03-28 RX ORDER — LANOLIN ALCOHOL/MO/W.PET/CERES
1000 CREAM (GRAM) TOPICAL DAILY
Qty: 30 TABLET | Refills: 2 | Status: SHIPPED | OUTPATIENT
Start: 2024-03-28

## 2024-03-28 RX ORDER — FERROUS SULFATE 325(65) MG
325 TABLET ORAL
Qty: 30 TABLET | Refills: 2 | Status: SHIPPED | OUTPATIENT
Start: 2024-03-28

## 2024-03-28 RX ORDER — FAMOTIDINE 10 MG/ML
20 INJECTION INTRAVENOUS ONCE AS NEEDED
Status: CANCELLED | OUTPATIENT
Start: 2024-03-29

## 2024-03-28 RX ORDER — EPINEPHRINE 0.3 MG/.3ML
0.3 INJECTION SUBCUTANEOUS EVERY 5 MIN PRN
Status: CANCELLED | OUTPATIENT
Start: 2024-03-29

## 2024-03-28 RX ORDER — HEPARIN 100 UNIT/ML
500 SYRINGE INTRAVENOUS AS NEEDED
OUTPATIENT
Start: 2024-03-29

## 2024-03-28 ASSESSMENT — PAIN SCALES - GENERAL: PAINLEVEL: 0-NO PAIN

## 2024-03-28 NOTE — LETTER
"March 28, 2024     Jazmyn Medina MD  [address]  [place], [address]  [facility] [address]    Patient: Radha Haley   YOB: 1990   Date of Visit: 3/28/2024       Dear Dr. Jazmyn Medina MD:    Thank you for referring Radha Haley to me for evaluation. Below are my notes for this consultation.  If you have questions, please do not hesitate to call me. I look forward to following your patient along with you.       Sincerely,     Soledad Whitfield, APRN-CNP      CC: No Recipients  ______________________________________________________________________________________    [facility]    PATIENT VISIT INFORMATION    Visit Type: New Visit    Referring Provider: Jazmyn Medina MD  Reason for referral: Anemia in pregnancy   Primary Practice Provider: Betty Mart MD    HEMATOLOGY HISTORY    Anemia started 1/15/2024 hemoglobin 10.2, 12 321.    2/7/2024 hemoglobin of 10.  Referring physician felt alpha thalassemia may be playing a role.  Iron 10% TSAT, spike in TIBC and UIBC, and ferritin 42.    3/13/2024 hemoglobin of 10.2.  B12 286.  3/28/2024 hgb 10.5, platelets 133, iron no Tsat, ferritin 40, albumin 3.3, calcium 8.3, and total protein 5.8.   HISTORY OF PRESENT ILLNESS     ID Statement: Radha Haley is a 33 year old female     Chief Complaint: Anemia Evaluation in pregnancy     Interval History:   Radha presents for evaluation of anemia in pregnancy.  She states she feels well and has no symptoms than what she considers \"normal pregnancy symptoms.\"  Some minor fatigue and once in a while fast heartbeat. She does note a Shigella infection 2-3 months ago when the whole household was ill with the bacterial infection. Diarrhea at that time. Resolved. Scheduled to be induced next Friday. On insulin for gestational diabetes.     Denies fever, night sweats, shortness of breath, chest pain, abdominal pain, abnormal stools, no D/N/V/C, blood in stool or melena, no " urinary concerns, no neuropathy, swelling, bleeding from any location, no bruising, no lymphadenopathy, rashes, or pain.     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     OCCUPATIONAL/ENVIRONMENTAL HISTORY/EXPOSURES:  -None reported    Active Problems, Allergy List, Medication List, and PMH/PSH/FH/Social Hx have been reviewed and reconciled in chart. Updates made when necessary.     REVIEW OF SYSTEMS   A review of systems has been completed and are negative for complaints except what is stated in the assessment, HPI, IH, ROS, and/or past medical history.    ALLERGIES AND MEDICATIONS     Allergies and Intolerances:   No Known Allergies   Medication Profile:   Current Outpatient Medications   Medication Instructions   • alcohol swabs pads, medicated Use 1, up to 5 times a day   • blood sugar diagnostic (OneTouch Ultra Test) strip Use 1 strip 4-6 times per day during pregnancy   • blood-glucose meter misc Test daily fasting and 1 hour after starting your meals.   • blood-glucose sensor (Dexcom G7 Sensor) device Use 1 sensor and change every 10 days   • cyanocobalamin (VITAMIN B-12) 1,000 mcg, oral, Daily   • ferrous sulfate, 325 mg ferrous sulfate, tablet 1 tablet, oral, Daily with breakfast   • FreeStyle Kassandra reader (FreeStyle Kassandra 2 Ray) misc Use for monitoring with Libre2 sensor   • FreeStyle Kassandra  "sensor system (FreeStyle Kassandra 2 Sensor) kit Use 1 sensor and change every 14 days   • insulin lispro (HumaLOG KwikPen Insulin) 100 unit/mL injection Inject 4 units before breakfast daily PRN and 8 units At dinner .   • insulin NPH, Isophane, (HumuLIN N NPH Insulin KwikPen) 100 unit/mL (3 mL) injection Inject 10 at breakfast and 32 At Bedtime . May take up to 50 units BID during pregnancy   • isopropyl alcohoL 70 % towelette Test daily before all meals/snacks and once before bedtime.   • lancets (OneTouch UltraSoft Lancets) misc Use 1 lancet 4-6 times a day to test blood sugar during pregnancy   • metoclopramide (REGLAN) 10 mg, oral, Once as needed   • pen needle, diabetic 32 gauge x 5/32\" needle Use one for ea injection   • Prenatal Vitamin 27 mg iron- 0.8 mg tablet 1 tablet, oral, Daily   • triamcinolone (Kenalog) 0.1 % ointment Topical, 2 times daily      Available Vaccination Record:   Immunization History   Administered Date(s) Administered   • Tdap vaccine, age 7 year and older (BOOSTRIX, ADACEL) 02/07/2024      PHYSICAL EXAM     Vital Signs/Measurements:       12/14/2023     3:10 PM 1/15/2024     2:00 PM 2/7/2024     9:00 AM 3/13/2024     8:00 AM 3/25/2024    12:00 PM 3/27/2024     9:00 AM 3/28/2024     9:00 AM   Vitals   Systolic 110 110 107 123 108 111 96   Diastolic 60 74 68 75 70 67 63   Heart Rate       91   Temp       36.3 °C (97.3 °F)   Resp       16   Height (in)       1.641 m (5' 4.61\")    Weight (lb) 199 200.2 200.6 201.2  202.4 199.74   BMI 34.16 kg/m2 34.36 kg/m2 34.43 kg/m2 34.54 kg/m2  34.74 kg/m2 33.64 kg/m2   BSA (m2) 2.02 m2 2.02 m2 2.03 m2 2.03 m2  2.04 m2 2.03 m2   Visit Report       Report       Significant value        Performance:   ECOG Performance Status: 0     Grade ECOG performance status   0 Fully active, able to carry on all pre-disease performance without restriction   1 Restricted in physically strenuous activity but ambulatory and able to carry out work of a light or sedentary " nature, e.g., light housework, office work   2 Ambulatory and capable of all selfcare but unable to carry out any work activities; Up and about more than 50% of waking hours   3 Capable of only limited selfcare, confined to bed or chair more than 50% of waking hours   4 Completely disabled; Cannot carry out any selfcare; Totally confined to bed or chair   5 Dead     Physical Exam:  General: Patient is pregnant female awake/alert/oriented x3, no distress, Nourished, hydrated, alert and cooperative, ambulating without difficulty  Skin: Expected color for ethnicity, good turgor, dry, no prominent lesions, rashes, unusual bruising, or bleeding   Hair: Normal texture and distribution   Nails: Normal color, no deformities    HEENT:   Head: Normocephalic, atraumatic, no visible or palpable masses, depressions, or scarring   Eyes: Conjunctiva clear, sclera non-icteric, no exudates or hemorrhages   Ears: nl appearance, hearing intact    Nose: no external lesions, mucosa non-inflamed, no rhinorrhea   Mouth: Mucous membranes moist, no lesions, sores, bleeding, or erythema     Head/Neck: Neck supple, no apparent injury, thyroid without mass or tenderness, No JVD, trachea midline, no bruits appreciated    Respiratory/Thorax: Patent airways, CTAB, chest symmetry, normal inspiratory and expiratory effort    Cardiovascular: Regular rate and rhythm, no murmur or gallop, no carotid bruit or thrills    Gastrointestinal: Bowel sounds normal   Genitourinary: deferred   Musculoskeletal: Normal gait, normal range of motion, no deformity, normal strength for baseline, no atrophy   Extremities: No amputations or deformities, cyanosis, edema or viscosities, peripheral pulses intact   Neurological: Sensation present to touch, intact senses, motor response and reflexes normal, normal strength   Breast:   Lymphatic: No significant lymphadenopathy   Psychological: Intact recent and remote memory, judgement, and insight. Appropriate mood, affect,  and behavior        RESULTS/DATA     Labs:   Lab Results   Component Value Date    WBC 7.1 03/28/2024    NEUTROABS 5.57 03/28/2024    IGABSOL 0.02 03/28/2024    LYMPHSABS 1.10 (L) 03/28/2024    MONOSABS 0.37 03/28/2024    EOSABS 0.04 03/28/2024    BASOSABS 0.02 03/28/2024    RBC 3.27 (L) 03/28/2024    MCV 94 03/28/2024    MCHC 34.3 03/28/2024    HGB 10.5 (L) 03/28/2024    HCT 30.6 (L) 03/28/2024     (L) 03/28/2024     Lab Results   Component Value Date    RETICCTPCT 3.4 (H) 03/28/2024      Lab Results   Component Value Date    CREATININE 0.51 03/28/2024    BUN 10 03/28/2024    EGFR >90 03/28/2024     03/28/2024    K 3.6 03/28/2024     03/28/2024    CO2 22 03/28/2024      Lab Results   Component Value Date    ALT 10 03/28/2024    AST 12 03/28/2024    ALKPHOS 95 03/28/2024    BILITOT 0.5 03/28/2024      Lab Results   Component Value Date    TSH 0.71 02/07/2024     Lab Results   Component Value Date    TSH 0.71 02/07/2024     Lab Results   Component Value Date    IRON 114 03/28/2024    TIBC  03/28/2024      Comment:      One or more of the analytes used in this calculation is outside of the analytical measurement range.      FERRITIN 40 03/28/2024     Lab Results   Component Value Date     03/28/2024        Radiology/Studies:   -NA    ASSESSMENT/PLAN     Assessment and Plan:   #1. Anemia in Pregnancy   Anemia started 1/15/2024 hemoglobin 10.2 and  B12 321.  Hemoglobin today 10.5 platelets 133 no other abnormality on CBC.  Albumin and total protein low calcium 8.3 kidney function good.  Corrected reticulocyte count 2.5.  Iron panel showing no saturation, UIBC over 450 and TIBC unable to be calculated.  Ferritin is 40.  Notable iron deficiency anemia in pregnancy. Secondly, she had a gastrointestinal infection and is gestational diabetic.  Insulin dependent. Bilirubin direct and LDH normal.  Direct antiglobulin test normal.  Haptoglobin, hemoglobin identification, and homocystine in process.      IV iron infusion scheduled.  Though likely will not benefit due to delivery scheduled in a week.  She will take oral iron and B12 daily if tolerates.  Likely she will need IV iron infusions following birth of baby.  She will follow-up after birth of baby and we will schedule IV iron infusions as needed. PreCert for Venofer in process.     **Please follow with specialties as scheduled for other comorbidities and routine health screenings.**    I have reviewed the patient's medical record including provider notes, laboratory and testing results, imaging, and procedures available within the system and outside the system pertinent to patient care.     Follow up:    RTC:  -5 week after birth of baby  -Iron Venofer infusion to be scheduled     Medications:  -Ferrous sulfate 325 mg daily sent to pharmacy  -B12 1000 mg sent to pharmacy   -Venofer to be scheduled    Imaging/Testing:  -NA    Referral:  -NA    Other Pertinent Appointments:  -OBGYN 4/3/2024      Patient Discussion Summary:  Discussed plan of care in detail. Patient states understanding and in agreement. Answered all questions. They will call with any additional questions and/or concerns.    Thank you for allowing me to participate in your care. It was a pleasure meeting you.    Sincerely,  Soledad Whitfield, APRN-CNP       This document may have been written by voice recognition software.  Please request clarification if there is documentation error or clarification is needed.   Time based billing: Please see documentation within this specific encounter.

## 2024-03-29 ENCOUNTER — APPOINTMENT (OUTPATIENT)
Dept: MATERNAL FETAL MEDICINE | Facility: CLINIC | Age: 34
End: 2024-03-29
Payer: COMMERCIAL

## 2024-03-29 ENCOUNTER — TELEPHONE (OUTPATIENT)
Dept: MATERNAL FETAL MEDICINE | Facility: CLINIC | Age: 34
End: 2024-03-29
Payer: COMMERCIAL

## 2024-03-29 LAB
HAPTOGLOB SERPL-MCNC: 61 MG/DL (ref 37–246)
HEMOGLOBIN A2: 2.5 % (ref 2–3.5)
HEMOGLOBIN A: 95.8 % (ref 95.8–98)
HEMOGLOBIN F: 1.7 % (ref 0–2)
HEMOGLOBIN IDENTIFICATION INTERPRETATION: NORMAL
PATH REVIEW-HGB IDENTIFICATION: NORMAL

## 2024-03-29 NOTE — TELEPHONE ENCOUNTER
Radha Haley left  states she had question about IOL plan.  That question was answered already.  [facility] visit on Wed = review of BG.    No questions or concerns   at this time

## 2024-03-30 LAB — GP B STREP GENITAL QL CULT: NORMAL

## 2024-04-01 ENCOUNTER — HOSPITAL ENCOUNTER (OUTPATIENT)
Dept: RADIOLOGY | Facility: CLINIC | Age: 34
Discharge: HOME | End: 2024-04-01
Payer: COMMERCIAL

## 2024-04-01 ENCOUNTER — INFUSION (OUTPATIENT)
Dept: HEMATOLOGY/ONCOLOGY | Facility: CLINIC | Age: 34
End: 2024-04-01
Payer: COMMERCIAL

## 2024-04-01 VITALS
DIASTOLIC BLOOD PRESSURE: 71 MMHG | RESPIRATION RATE: 18 BRPM | OXYGEN SATURATION: 98 % | BODY MASS INDEX: 34.2 KG/M2 | HEART RATE: 77 BPM | TEMPERATURE: 98.8 F | WEIGHT: 203.04 LBS | SYSTOLIC BLOOD PRESSURE: 110 MMHG

## 2024-04-01 DIAGNOSIS — O99.013 ANEMIA DURING PREGNANCY IN THIRD TRIMESTER (HHS-HCC): ICD-10-CM

## 2024-04-01 DIAGNOSIS — O24.419 GESTATIONAL DIABETES MELLITUS, CLASS A2 (HHS-HCC): ICD-10-CM

## 2024-04-01 PROCEDURE — 96365 THER/PROPH/DIAG IV INF INIT: CPT | Mod: INF

## 2024-04-01 PROCEDURE — 2500000004 HC RX 250 GENERAL PHARMACY W/ HCPCS (ALT 636 FOR OP/ED): Mod: SE

## 2024-04-01 PROCEDURE — 76819 FETAL BIOPHYS PROFIL W/O NST: CPT

## 2024-04-01 PROCEDURE — 76819 FETAL BIOPHYS PROFIL W/O NST: CPT | Performed by: STUDENT IN AN ORGANIZED HEALTH CARE EDUCATION/TRAINING PROGRAM

## 2024-04-01 PROCEDURE — 96366 THER/PROPH/DIAG IV INF ADDON: CPT | Mod: INF

## 2024-04-01 RX ORDER — DIPHENHYDRAMINE HYDROCHLORIDE 50 MG/ML
50 INJECTION INTRAMUSCULAR; INTRAVENOUS AS NEEDED
OUTPATIENT
Start: 2024-04-05

## 2024-04-01 RX ORDER — ALBUTEROL SULFATE 0.83 MG/ML
3 SOLUTION RESPIRATORY (INHALATION) AS NEEDED
Status: DISCONTINUED | OUTPATIENT
Start: 2024-04-01 | End: 2024-04-01 | Stop reason: HOSPADM

## 2024-04-01 RX ORDER — ALBUTEROL SULFATE 0.83 MG/ML
3 SOLUTION RESPIRATORY (INHALATION) AS NEEDED
OUTPATIENT
Start: 2024-04-05

## 2024-04-01 RX ORDER — DIPHENHYDRAMINE HYDROCHLORIDE 50 MG/ML
50 INJECTION INTRAMUSCULAR; INTRAVENOUS AS NEEDED
Status: DISCONTINUED | OUTPATIENT
Start: 2024-04-01 | End: 2024-04-01 | Stop reason: HOSPADM

## 2024-04-01 RX ORDER — EPINEPHRINE 0.3 MG/.3ML
0.3 INJECTION SUBCUTANEOUS EVERY 5 MIN PRN
OUTPATIENT
Start: 2024-04-05

## 2024-04-01 RX ORDER — FAMOTIDINE 10 MG/ML
20 INJECTION INTRAVENOUS ONCE AS NEEDED
OUTPATIENT
Start: 2024-04-05

## 2024-04-01 RX ORDER — FAMOTIDINE 10 MG/ML
20 INJECTION INTRAVENOUS ONCE AS NEEDED
Status: DISCONTINUED | OUTPATIENT
Start: 2024-04-01 | End: 2024-04-01 | Stop reason: HOSPADM

## 2024-04-01 RX ORDER — EPINEPHRINE 0.3 MG/.3ML
0.3 INJECTION SUBCUTANEOUS EVERY 5 MIN PRN
Status: DISCONTINUED | OUTPATIENT
Start: 2024-04-01 | End: 2024-04-01 | Stop reason: HOSPADM

## 2024-04-01 RX ADMIN — IRON SUCROSE 300 MG: 20 INJECTION, SOLUTION INTRAVENOUS at 10:48

## 2024-04-01 ASSESSMENT — PAIN SCALES - GENERAL: PAINLEVEL: 0-NO PAIN

## 2024-04-01 NOTE — SIGNIFICANT EVENT
Post Venofer infusion vitals WNL.  Patient received infusion with no issues.  Patient did not want to stay for observation period as she has another doctor's appointment at [facility] to go to.  Educated patient and  on s/s of hypersensitivity reaction and both verbalized understanding.

## 2024-04-02 ENCOUNTER — PATIENT MESSAGE (OUTPATIENT)
Dept: MATERNAL FETAL MEDICINE | Facility: CLINIC | Age: 34
End: 2024-04-02
Payer: COMMERCIAL

## 2024-04-03 ENCOUNTER — APPOINTMENT (OUTPATIENT)
Dept: RADIOLOGY | Facility: CLINIC | Age: 34
End: 2024-04-03
Payer: COMMERCIAL

## 2024-04-03 ENCOUNTER — ROUTINE PRENATAL (OUTPATIENT)
Dept: OBSTETRICS AND GYNECOLOGY | Facility: CLINIC | Age: 34
End: 2024-04-03
Payer: COMMERCIAL

## 2024-04-03 ENCOUNTER — PROCEDURE VISIT (OUTPATIENT)
Dept: OBSTETRICS AND GYNECOLOGY | Facility: CLINIC | Age: 34
End: 2024-04-03
Payer: COMMERCIAL

## 2024-04-03 VITALS — SYSTOLIC BLOOD PRESSURE: 106 MMHG | WEIGHT: 203 LBS | DIASTOLIC BLOOD PRESSURE: 66 MMHG | BODY MASS INDEX: 34.19 KG/M2

## 2024-04-03 DIAGNOSIS — O24.419 GDM, CLASS A2 (HHS-HCC): ICD-10-CM

## 2024-04-03 DIAGNOSIS — O99.213 OBESITY AFFECTING PREGNANCY IN THIRD TRIMESTER, UNSPECIFIED OBESITY TYPE (HHS-HCC): ICD-10-CM

## 2024-04-03 DIAGNOSIS — N85.A UTERINE SCAR FROM PREVIOUS CESAREAN DELIVERY: ICD-10-CM

## 2024-04-03 DIAGNOSIS — O24.419 GDM, CLASS A2 (HHS-HCC): Primary | ICD-10-CM

## 2024-04-03 DIAGNOSIS — O99.013 ANEMIA DURING PREGNANCY IN THIRD TRIMESTER (HHS-HCC): ICD-10-CM

## 2024-04-03 PROCEDURE — 99214 OFFICE O/P EST MOD 30 MIN: CPT | Performed by: OBSTETRICS & GYNECOLOGY

## 2024-04-03 PROCEDURE — 59025 FETAL NON-STRESS TEST: CPT | Performed by: OBSTETRICS & GYNECOLOGY

## 2024-04-03 PROCEDURE — 99214 OFFICE O/P EST MOD 30 MIN: CPT | Mod: TH | Performed by: OBSTETRICS & GYNECOLOGY

## 2024-04-03 NOTE — PATIENT INSTRUCTIONS
DR. MEDINA'S DELIVERY GUIDE    HOW SHOULD I PREPARE?  Think about what you want your delivery to be like and let your team know  Don't hesitate to speak up if you have concerns or questions  Stay mentally flexible - even with modern medicine, delivery can be unpredictable!  Make sure you have reliable and timely transportation to the hospital  Keep your gas tank at least ¼ full  Keep a towel in the car (to catch amniotic fluid if your water breaks)  Plan for childcare, weather-related traffic delays, etc.  Hospital bag suggestions: birth plan, phone chargers, personal toiletries, hair accessories, chewing gum, water bottle, personal pillow or blanket, things to help you relax during labor, change of clothes for postpartum, nursing bra, eye mask and ear plugs, flip flops for shower, personal towel for shower, baby clothes for going home (bring 1 size up and down from expected size)    WHEN SHOULD I CALL?  When your water breaks (can feel like a gush, or a non-stop trickle of fluid)  When your contractions have been happening regularly for at least 2 hours non-stop AND  First Delivery: Contractions are occurring every 5 minutes or less  Repeat Delivery/Planned : Contractions are occurring every 8 minutes or less  If the baby is moving less than usual   If you experience bleeding like a period  For any other symptoms that just doesn't feel “right” to you    WHO DO I CALL?  Call [phone] for the [facility] office and [phone] for the [place] office. During the day, ask the  to direct you to the office nurse. After hours, you will be directed to the doctor on Labor & Delivery by the answering service. Please make sure to tell the answering service which L&D location you plan to go to so they can contact the correct on call provider.    WHERE DO I DELIVER?  Low risk pregnancies have the option to deliver at any  hospital. High risk pregnancies should deliver at [facility]. Dr. Medina  only delivers at [facility], typically on Thursday nights. To schedule a hospital tour, call [phone].    HOW DO I GET THERE?  [facility] at [facility]: [address], [place]. This will take you to the entrance of [facility], which connects to [facility]. The parking garage is also closest to this address, and there is a  as well. Walk through [facility] towards the atrium, then [facility] will be on your right just past the [employer] shop. Labor and Delivery is located on the 2nd floor via elevator.   [facility] and  [place] at [facility]: [address] [facility]. Entrance is located between the Emergency Department and the Main Hospital building. Look for the [employer] logo above the entrance. You can also enter from the entrance to the [facility] where there is a  - just turn right past the information desk and walk down the gallery hendrix until you reach [facility]. Labor and Delivery is located on the 3rd floor via elevator.    WHO CAN BE WITH ME?  The visitor policy can be found online at [email or web address].   Certified doulas do not count as visitors.     HOW CAN I DECREASE MY RISK FOR  DELIVERY?  Keep your body as strong and healthy as possible  Look through the [employer]® website to understand how movement can help your baby get in the right place in the pelvis   Use professional birth support, such as a    Change positions frequently during labor  Stay well hydrated throughout your labor, including “clears” for caloric nourishment (honey water, apple juice, etc.)   Allow enough time for labor - it can take over 24 hours!  Rest when you can so you are mentally ready to push once you get to 10cm dilated  Try pushing in a different position if you're not making progress. Consider pushing on hands and knees, closed  knee pushing, use of a birthing bar, etc.     Delivery is a team effort. Please speak up if you don't feel included in the decision-making.       DR. PIPER'S POSTPARTUM GUIDE    Going through all the physical and emotional changes of pregnancy is no small feat, and it's important to realize these changes continue even after delivery. Only about 50% of women go to their postpartum visits, which means a lot of women are missing out on an opportunity to check on their own health, receive support, and ask important questions. Every woman should have a check-up 4-6 weeks after their delivery, and some women will need to be seen even sooner than that.    We highly encourage you to take your postpartum care just as seriously as your prenatal care. Many serious long-term health issues related to pregnancy actually happen postpartum. Our goal is to help you feel supported, capable, and safe as you navigate your unique post-pregnancy journey.    SAFETY  You should immediately contact the doctor's office if you experience any of the following:  Chest pain or trouble breathing  Blood pressures >150/100, that is just as high or higher when repeated 20 minutes later  Severe abdominal pain not responding to your discharge pain medications  Heavy bleeding fully soaking a pad in under 30 minutes or soiling your clothes   Chills, shakes, or fever >100.4°F  Suicidal thoughts  You should also call anytime you just don't feel right - it is always better to be safe than sorry!    COMMON POSTPARTUM CONCERNS  A variety of other postpartum issues are just as harmful to a woman's wellbeing and health, even if they are not deadly. Please call for an appointment if you have concerns about the following, or any other bothersome issue:  Breastfeeding difficulties  Mood changes such as depression or anxiety  Pain during sex or with activity  Abnormal uterine bleeding past 6-8 weeks postpartum  You are not alone, and we are here to help  you!    WHAT TO EXPECT AT YOUR POSTPARTUM VISIT  During your postpartum visit, we will ask you questions about your pregnancy complications, postpartum mood and support, infant feeding journey, contraceptive plans, and more. If your concerns require a more in-depth evaluation, we may ask you to come back for a follow-up visit. Certain follow-up visits may be billed outside of your insurance's OB global package.     POSTPARTUM SUPPORT RESOURCES    [facility] POSTPARTUM PROGRAMMING  https://www.[email or web address]/services/obgy-[facility]/patient-resources/classes-and-support   [phone]  Free parenting support offered via “Mommy and Baby Too!” peer groups and WIC-lead “Healthy Mom and Baby” programs.    [facility] LACTATION SUPPORT  http://[email or web address]/rian/health-[email or web address]-wellness/pregnancy-resources/lactation-services  [phone]  In person and virtual appointments offered at multiple locations for breastfeeding support.    BREASTFEEDING MEDICINE [facility]  https://[employer]/  [phone]. Provides full spectrum breastfeeding assistance. Located in the [facility] Pediatric[facility] building, but open to parents seeing other pediatricians.    [facility] PELVIC FLOOR PHYSICAL THERAPY  https://www.[facility].org/services/obgy-[facility]/female-pelvic-health/conditions-and-treatments/cshgld-irefw-zddeqtzmnhjskb  [phone]  Licensed female therapists help with a variety of postpartum pelvic floor concerns, including pain, weakness, incontinence, and more. Referral may be required.     [facility] WOMEN'S MENTAL HEALTH CLINIC  [email or web address]://[email or web address]/services/[email or web address]/conditions-treatments/womens-mental-health   [phone], ask for “Women's Mental Health” providers for help with postpartum anxiety, depression, OCD, PTSD, and more.    HELP ME GROW  http://www.helpmegrow.[place].gov/  Help Me Grow is an evidence-based program that promotes healthy  growth and development for babies and young children by providing professional support in the home for pregnant mothers or new parents. You can self-refer through the website or ask your doctor to make a referral.    LOOKING FOR PROFESSIONAL POSTPARTUM SUPPORT?  [employer] ( Certifying Organization)  https://www.reji.org/  Type in your zip code to find a certified postpartum  near you    [employer]  http://www.[email or web address] Memorial HospitalTus reQRdos.org/    THE [facility]  http://[email or web address].Viewpoint/    [employer] Sur[employer] App   [email or web address]

## 2024-04-03 NOTE — PROGRESS NOTES
GDMA2 with LGA fetus. For IOL on 4/5.   GBS neg  Anemia: on PO iron. Sp Heme consult. May need IV iron postpartum.    NST for GDMA2    FHT: 120, mod byron, + accel, - decel  [place]: none  Interpretation: reactive.    Jazmyn Medina MD

## 2024-04-05 ENCOUNTER — ANESTHESIA EVENT (OUTPATIENT)
Dept: OBSTETRICS AND GYNECOLOGY | Facility: HOSPITAL | Age: 34
End: 2024-04-05
Payer: COMMERCIAL

## 2024-04-05 ENCOUNTER — ANESTHESIA (OUTPATIENT)
Dept: OBSTETRICS AND GYNECOLOGY | Facility: HOSPITAL | Age: 34
End: 2024-04-05
Payer: COMMERCIAL

## 2024-04-05 ENCOUNTER — APPOINTMENT (OUTPATIENT)
Dept: OBSTETRICS AND GYNECOLOGY | Facility: HOSPITAL | Age: 34
End: 2024-04-05
Payer: COMMERCIAL

## 2024-04-05 ENCOUNTER — HOSPITAL ENCOUNTER (INPATIENT)
Facility: HOSPITAL | Age: 34
LOS: 2 days | Discharge: HOME | End: 2024-04-07
Attending: OBSTETRICS & GYNECOLOGY | Admitting: OBSTETRICS & GYNECOLOGY
Payer: COMMERCIAL

## 2024-04-05 DIAGNOSIS — O24.419 GDM, CLASS A2 (HHS-HCC): ICD-10-CM

## 2024-04-05 DIAGNOSIS — N39.3 SUI (STRESS URINARY INCONTINENCE, FEMALE): Primary | ICD-10-CM

## 2024-04-05 PROBLEM — Z3A.38 38 WEEKS GESTATION OF PREGNANCY (HHS-HCC): Status: ACTIVE | Noted: 2024-04-05

## 2024-04-05 LAB
ABO GROUP (TYPE) IN BLOOD: NORMAL
ANTIBODY SCREEN: NORMAL
ERYTHROCYTE [DISTWIDTH] IN BLOOD BY AUTOMATED COUNT: 15.2 % (ref 11.5–14.5)
GLUCOSE BLD MANUAL STRIP-MCNC: 125 MG/DL (ref 74–99)
HCT VFR BLD AUTO: 32.2 % (ref 36–46)
HGB BLD-MCNC: 11 G/DL (ref 12–16)
MCH RBC QN AUTO: 32.6 PG (ref 26–34)
MCHC RBC AUTO-ENTMCNC: 34.2 G/DL (ref 32–36)
MCV RBC AUTO: 96 FL (ref 80–100)
NRBC BLD-RTO: 0 /100 WBCS (ref 0–0)
PLATELET # BLD AUTO: 140 X10*3/UL (ref 150–450)
RBC # BLD AUTO: 3.37 X10*6/UL (ref 4–5.2)
RH FACTOR (ANTIGEN D): NORMAL
TREPONEMA PALLIDUM IGG+IGM AB [PRESENCE] IN SERUM OR PLASMA BY IMMUNOASSAY: NONREACTIVE
WBC # BLD AUTO: 7.6 X10*3/UL (ref 4.4–11.3)

## 2024-04-05 PROCEDURE — 82947 ASSAY GLUCOSE BLOOD QUANT: CPT

## 2024-04-05 PROCEDURE — 86780 TREPONEMA PALLIDUM: CPT | Performed by: STUDENT IN AN ORGANIZED HEALTH CARE EDUCATION/TRAINING PROGRAM

## 2024-04-05 PROCEDURE — 1120000001 HC OB PRIVATE ROOM DAILY

## 2024-04-05 PROCEDURE — 85027 COMPLETE CBC AUTOMATED: CPT | Performed by: STUDENT IN AN ORGANIZED HEALTH CARE EDUCATION/TRAINING PROGRAM

## 2024-04-05 PROCEDURE — 3E033VJ INTRODUCTION OF OTHER HORMONE INTO PERIPHERAL VEIN, PERCUTANEOUS APPROACH: ICD-10-PCS | Performed by: OBSTETRICS & GYNECOLOGY

## 2024-04-05 PROCEDURE — 7210000002 HC LABOR PER HOUR

## 2024-04-05 PROCEDURE — 86901 BLOOD TYPING SEROLOGIC RH(D): CPT | Performed by: STUDENT IN AN ORGANIZED HEALTH CARE EDUCATION/TRAINING PROGRAM

## 2024-04-05 PROCEDURE — 2500000002 HC RX 250 W HCPCS SELF ADMINISTERED DRUGS (ALT 637 FOR MEDICARE OP, ALT 636 FOR OP/ED): Performed by: STUDENT IN AN ORGANIZED HEALTH CARE EDUCATION/TRAINING PROGRAM

## 2024-04-05 PROCEDURE — 86920 COMPATIBILITY TEST SPIN: CPT

## 2024-04-05 PROCEDURE — 2500000004 HC RX 250 GENERAL PHARMACY W/ HCPCS (ALT 636 FOR OP/ED)

## 2024-04-05 PROCEDURE — 2500000004 HC RX 250 GENERAL PHARMACY W/ HCPCS (ALT 636 FOR OP/ED): Performed by: STUDENT IN AN ORGANIZED HEALTH CARE EDUCATION/TRAINING PROGRAM

## 2024-04-05 PROCEDURE — 36415 COLL VENOUS BLD VENIPUNCTURE: CPT | Performed by: STUDENT IN AN ORGANIZED HEALTH CARE EDUCATION/TRAINING PROGRAM

## 2024-04-05 RX ORDER — LIDOCAINE HYDROCHLORIDE 10 MG/ML
30 INJECTION INFILTRATION; PERINEURAL ONCE AS NEEDED
Status: DISCONTINUED | OUTPATIENT
Start: 2024-04-05 | End: 2024-04-06

## 2024-04-05 RX ORDER — HYDRALAZINE HYDROCHLORIDE 20 MG/ML
5 INJECTION INTRAMUSCULAR; INTRAVENOUS ONCE AS NEEDED
Status: DISCONTINUED | OUTPATIENT
Start: 2024-04-05 | End: 2024-04-06

## 2024-04-05 RX ORDER — ONDANSETRON HYDROCHLORIDE 2 MG/ML
4 INJECTION, SOLUTION INTRAVENOUS EVERY 6 HOURS PRN
Status: DISCONTINUED | OUTPATIENT
Start: 2024-04-05 | End: 2024-04-06

## 2024-04-05 RX ORDER — METOCLOPRAMIDE 10 MG/1
10 TABLET ORAL EVERY 6 HOURS PRN
Status: DISCONTINUED | OUTPATIENT
Start: 2024-04-05 | End: 2024-04-06

## 2024-04-05 RX ORDER — NIFEDIPINE 10 MG/1
10 CAPSULE ORAL ONCE AS NEEDED
Status: DISCONTINUED | OUTPATIENT
Start: 2024-04-05 | End: 2024-04-06

## 2024-04-05 RX ORDER — INSULIN LISPRO 100 [IU]/ML
0-10 INJECTION, SOLUTION INTRAVENOUS; SUBCUTANEOUS EVERY 4 HOURS
Status: DISCONTINUED | OUTPATIENT
Start: 2024-04-05 | End: 2024-04-06

## 2024-04-05 RX ORDER — LABETALOL HYDROCHLORIDE 5 MG/ML
20 INJECTION, SOLUTION INTRAVENOUS ONCE AS NEEDED
Status: DISCONTINUED | OUTPATIENT
Start: 2024-04-05 | End: 2024-04-06

## 2024-04-05 RX ORDER — DEXTROSE 50 % IN WATER (D50W) INTRAVENOUS SYRINGE
25
Status: DISCONTINUED | OUTPATIENT
Start: 2024-04-05 | End: 2024-04-06

## 2024-04-05 RX ORDER — OXYTOCIN/0.9 % SODIUM CHLORIDE 30/500 ML
2-30 PLASTIC BAG, INJECTION (ML) INTRAVENOUS CONTINUOUS
Status: DISCONTINUED | OUTPATIENT
Start: 2024-04-05 | End: 2024-04-06

## 2024-04-05 RX ORDER — FENTANYL CITRATE 50 UG/ML
50 INJECTION, SOLUTION INTRAMUSCULAR; INTRAVENOUS ONCE
Status: COMPLETED | OUTPATIENT
Start: 2024-04-05 | End: 2024-04-05

## 2024-04-05 RX ORDER — SODIUM CHLORIDE, SODIUM LACTATE, POTASSIUM CHLORIDE, CALCIUM CHLORIDE 600; 310; 30; 20 MG/100ML; MG/100ML; MG/100ML; MG/100ML
125 INJECTION, SOLUTION INTRAVENOUS CONTINUOUS
Status: DISCONTINUED | OUTPATIENT
Start: 2024-04-05 | End: 2024-04-06

## 2024-04-05 RX ORDER — METHYLERGONOVINE MALEATE 0.2 MG/ML
0.2 INJECTION INTRAVENOUS ONCE AS NEEDED
Status: DISCONTINUED | OUTPATIENT
Start: 2024-04-05 | End: 2024-04-06

## 2024-04-05 RX ORDER — OXYTOCIN/0.9 % SODIUM CHLORIDE 30/500 ML
60 PLASTIC BAG, INJECTION (ML) INTRAVENOUS ONCE AS NEEDED
Status: DISCONTINUED | OUTPATIENT
Start: 2024-04-05 | End: 2024-04-06

## 2024-04-05 RX ORDER — DEXTROSE 40 %
30 GEL (GRAM) ORAL
Status: DISCONTINUED | OUTPATIENT
Start: 2024-04-05 | End: 2024-04-06

## 2024-04-05 RX ORDER — NALBUPHINE HYDROCHLORIDE 10 MG/ML
10 INJECTION, SOLUTION INTRAMUSCULAR; INTRAVENOUS; SUBCUTANEOUS
Status: DISCONTINUED | OUTPATIENT
Start: 2024-04-05 | End: 2024-04-06

## 2024-04-05 RX ORDER — ONDANSETRON 4 MG/1
4 TABLET, FILM COATED ORAL EVERY 6 HOURS PRN
Status: DISCONTINUED | OUTPATIENT
Start: 2024-04-05 | End: 2024-04-06

## 2024-04-05 RX ORDER — METOCLOPRAMIDE HYDROCHLORIDE 5 MG/ML
10 INJECTION INTRAMUSCULAR; INTRAVENOUS EVERY 6 HOURS PRN
Status: DISCONTINUED | OUTPATIENT
Start: 2024-04-05 | End: 2024-04-06

## 2024-04-05 RX ORDER — BUTORPHANOL TARTRATE 1 MG/ML
1 INJECTION INTRAMUSCULAR; INTRAVENOUS EVERY 10 MIN PRN
Status: DISCONTINUED | OUTPATIENT
Start: 2024-04-05 | End: 2024-04-06

## 2024-04-05 RX ORDER — TERBUTALINE SULFATE 1 MG/ML
0.25 INJECTION SUBCUTANEOUS ONCE AS NEEDED
Status: DISCONTINUED | OUTPATIENT
Start: 2024-04-05 | End: 2024-04-06

## 2024-04-05 RX ORDER — OXYTOCIN 10 [USP'U]/ML
10 INJECTION, SOLUTION INTRAMUSCULAR; INTRAVENOUS ONCE AS NEEDED
Status: DISCONTINUED | OUTPATIENT
Start: 2024-04-05 | End: 2024-04-06

## 2024-04-05 RX ORDER — LOPERAMIDE HYDROCHLORIDE 2 MG/1
4 CAPSULE ORAL EVERY 2 HOUR PRN
Status: DISCONTINUED | OUTPATIENT
Start: 2024-04-05 | End: 2024-04-06

## 2024-04-05 RX ORDER — MISOPROSTOL 200 UG/1
800 TABLET ORAL ONCE AS NEEDED
Status: DISCONTINUED | OUTPATIENT
Start: 2024-04-05 | End: 2024-04-06

## 2024-04-05 RX ORDER — CARBOPROST TROMETHAMINE 250 UG/ML
250 INJECTION, SOLUTION INTRAMUSCULAR ONCE AS NEEDED
Status: DISCONTINUED | OUTPATIENT
Start: 2024-04-05 | End: 2024-04-06

## 2024-04-05 RX ORDER — TRANEXAMIC ACID 100 MG/ML
1000 INJECTION, SOLUTION INTRAVENOUS ONCE AS NEEDED
Status: DISCONTINUED | OUTPATIENT
Start: 2024-04-05 | End: 2024-04-06

## 2024-04-05 RX ORDER — DEXTROSE 40 %
15 GEL (GRAM) ORAL
Status: DISCONTINUED | OUTPATIENT
Start: 2024-04-05 | End: 2024-04-06

## 2024-04-05 RX ADMIN — SODIUM CHLORIDE, POTASSIUM CHLORIDE, SODIUM LACTATE AND CALCIUM CHLORIDE 125 ML/HR: 600; 310; 30; 20 INJECTION, SOLUTION INTRAVENOUS at 17:49

## 2024-04-05 RX ADMIN — FENTANYL CITRATE 50 MCG: 50 INJECTION, SOLUTION INTRAMUSCULAR; INTRAVENOUS at 18:30

## 2024-04-05 RX ADMIN — Medication 2 MILLI-UNITS/MIN: at 20:30

## 2024-04-05 RX ADMIN — Medication 2 MILLI-UNITS/MIN: at 20:29

## 2024-04-05 RX ADMIN — INSULIN LISPRO 2 UNITS: 100 INJECTION, SOLUTION INTRAVENOUS; SUBCUTANEOUS at 20:00

## 2024-04-05 SDOH — HEALTH STABILITY: MENTAL HEALTH: NON-SPECIFIC ACTIVE SUICIDAL THOUGHTS (PAST 1 MONTH): NO

## 2024-04-05 SDOH — SOCIAL STABILITY: SOCIAL INSECURITY: ARE THERE ANY APPARENT SIGNS OF INJURIES/BEHAVIORS THAT COULD BE RELATED TO ABUSE/NEGLECT?: NO

## 2024-04-05 SDOH — SOCIAL STABILITY: SOCIAL INSECURITY: ABUSE SCREEN: ADULT

## 2024-04-05 SDOH — SOCIAL STABILITY: SOCIAL INSECURITY: VERBAL ABUSE: DENIES

## 2024-04-05 SDOH — ECONOMIC STABILITY: HOUSING INSECURITY: DO YOU FEEL UNSAFE GOING BACK TO THE PLACE WHERE YOU ARE LIVING?: NO

## 2024-04-05 SDOH — HEALTH STABILITY: MENTAL HEALTH: WERE YOU ABLE TO COMPLETE ALL THE BEHAVIORAL HEALTH SCREENINGS?: YES

## 2024-04-05 SDOH — SOCIAL STABILITY: SOCIAL INSECURITY: PHYSICAL ABUSE: DENIES

## 2024-04-05 SDOH — SOCIAL STABILITY: SOCIAL INSECURITY: DO YOU FEEL ANYONE HAS EXPLOITED OR TAKEN ADVANTAGE OF YOU FINANCIALLY OR OF YOUR PERSONAL PROPERTY?: NO

## 2024-04-05 SDOH — SOCIAL STABILITY: SOCIAL INSECURITY: DOES ANYONE TRY TO KEEP YOU FROM HAVING/CONTACTING OTHER FRIENDS OR DOING THINGS OUTSIDE YOUR HOME?: NO

## 2024-04-05 SDOH — SOCIAL STABILITY: SOCIAL INSECURITY: HAVE YOU HAD THOUGHTS OF HARMING ANYONE ELSE?: NO

## 2024-04-05 SDOH — HEALTH STABILITY: MENTAL HEALTH: HAVE YOU USED ANY PRESCRIPTION DRUGS OTHER THAN PRESCRIBED IN THE PAST 12 MONTHS?: NO

## 2024-04-05 SDOH — SOCIAL STABILITY: SOCIAL INSECURITY: ARE YOU OR HAVE YOU BEEN THREATENED OR ABUSED PHYSICALLY, EMOTIONALLY, OR SEXUALLY BY ANYONE?: NO

## 2024-04-05 SDOH — HEALTH STABILITY: MENTAL HEALTH: WISH TO BE DEAD (PAST 1 MONTH): NO

## 2024-04-05 SDOH — HEALTH STABILITY: MENTAL HEALTH: SUICIDAL BEHAVIOR (LIFETIME): NO

## 2024-04-05 SDOH — HEALTH STABILITY: MENTAL HEALTH: HAVE YOU USED ANY SUBSTANCES (CANABIS, COCAINE, HEROIN, HALLUCINOGENS, INHALANTS, ETC.) IN THE PAST 12 MONTHS?: NO

## 2024-04-05 SDOH — SOCIAL STABILITY: SOCIAL INSECURITY: HAS ANYONE EVER THREATENED TO HURT YOUR FAMILY OR YOUR PETS?: NO

## 2024-04-05 ASSESSMENT — LIFESTYLE VARIABLES
HOW MANY STANDARD DRINKS CONTAINING ALCOHOL DO YOU HAVE ON A TYPICAL DAY: PATIENT DOES NOT DRINK
HOW OFTEN DO YOU HAVE 6 OR MORE DRINKS ON ONE OCCASION: NEVER
AUDIT-C TOTAL SCORE: 0
HOW OFTEN DO YOU HAVE A DRINK CONTAINING ALCOHOL: NEVER
AUDIT-C TOTAL SCORE: 0
SKIP TO QUESTIONS 9-10: 1

## 2024-04-05 ASSESSMENT — PAIN SCALES - GENERAL
PAINLEVEL_OUTOF10: 0 - NO PAIN

## 2024-04-05 ASSESSMENT — PATIENT HEALTH QUESTIONNAIRE - PHQ9
1. LITTLE INTEREST OR PLEASURE IN DOING THINGS: NOT AT ALL
2. FEELING DOWN, DEPRESSED OR HOPELESS: NOT AT ALL
SUM OF ALL RESPONSES TO PHQ9 QUESTIONS 1 & 2: 0

## 2024-04-05 NOTE — ANESTHESIA PREPROCEDURE EVALUATION
Patient: Radha Haley    Evaluation Method: In-person visit    Procedure Information    Date: 24  Procedure: Labor Consult         Relevant Problems   Endocrine   (+) GDM, class A2   (+) Obesity affecting pregnancy in third trimester      Hematology   (+) Anemia in pregnancy      GYN   (+) 38 weeks gestation of pregnancy   (+) High risk multigravida in third trimester       Clinical information reviewed:    Allergies  Meds               NPO Detail:  No data recorded     OB/Gyn Evaluation    Present Pregnancy    Patient is pregnant now.  (+) , previous  section   Obstetric History                Physical Exam    Airway  Mallampati: II  TM distance: >3 FB  Neck ROM: full     Cardiovascular    Dental - normal exam     Pulmonary    Abdominal            Anesthesia Plan    History of general anesthesia?: yes  History of complications of general anesthesia?: no    ASA 2     epidural     Anesthetic plan and risks discussed with patient.  Use of blood products discussed with patient who.    Plan discussed with attending.

## 2024-04-05 NOTE — H&P
Obstetrical Admission History and Physical     34yo  at 38.1wga by 13w us admitted for TOLAC IOL in s/o A2DM and LGA growth pattern.     Pregnancy notable for:   - A2DM, NPH ; lispro , (last growth 3/21 EFW 3841g >99%, AC >99%)   - Hx of  for arrest of dilation, 10#11oz followed by , MFMU 81.8%   - Anemia, hgb 10.5 (3/28)  - BMI 35     Chief Complaint: Scheduled Induction    Assessment/Plan    33 y.o.  @ 38w1d by 13w us presenting for induction of labor for Gestational diabetes.     TOALC IOL  Options for delivery have been discussed with the patient and she elects for an induction of labor. Cervical ripening with cytotec, cervidil, other prostaglandin agents has been discussed.  Induction of labor with pitocin, amniotomy, cytotec, and cervical balloon have been discussed in detail. The risks, benefits, complications, alternatives, expected outcomes, potential problems during recuperation and recovery, and the risks of not performing the procedure were discussed with the patient. The patient stated understanding that the risks of delivery include, but are not limited to: death; reaction to medications; injury to bowel, bladder, ureters, uterus, cervix, vagina, and other pelvic and abdominal structures, infection; blood loss and possible need for transfusion; and potential need for surgery, including hysterectomy. The risks of injury to the infant during delivery were also discussed. All questions were answered. There was concurrence with the planned procedure, and the patient wanted to proceed.    - Reviewed with patient the R/B/A of TOLAC vs RCS including: (1) Successful TOLAC BENEFITS (faster recovery time after birth of infant, avoidance of major abdominal surgery, lower risk of blood loss/infection/blood clots/future pregnancy complications, and decreased breathing difficulty at birth) , (2) Planned REPEAT CD BENEFITS (potential scheduled delivery date, eliminated risks of  TOLAC), (3) Unsuccessful TOLAC RISKS (tear in uterus in 0.5-0.9% women attempting TOLAC and requires an immediate CD and can lead to brain injury or death of the baby, increased risk of bleeding/infection/blood clots/hysterectomy, (4) Planned REPEAT CD BENEFITS (complication risks increase with # of prior CDs, increased risk of blood loss/infection/blood clots/injury to other organs/blood vessels)  - TOLAC consent signed and on chart. All questions answered.  Pt desires to proceed.  -  Hx of CS for arrest of dilation, 10#11 followed by , MFMU 81.8%      Admit to inpatient status. I anticipate that this patient will require a stay exceeding at least 2 midnights for delivery and postpartum.  Induction of labor with pitocin, amniotomy, and cervical ripening balloon  Management of pregnancy complications, as indicated.    FETAL WELLBEING  PNLs reviewed and wnl except anemia  CEFM : Cat 1 currently  GBS neg     A2DM, LGA    -  home regimen NPH ; lispro , (last growth 3/21 EFW 3841g >99%, AC >99%)   - plan for PPD#1 2-hour GTT   - plan for q4 hour POCT and SSI while on CLD/NPO  - The patient was counseled extensively on the risks of a shoulder dystocia, including the maternal risks of perineal lacerations, pubic symphysis dislocations, episiotomy and the fetal risks of brachial plexus injuries, clavicular/humeral fractures, HIE, and death. We discussed the different maneuvers that we would use and the indications for a CD including NRFHTS, arrest of dilation or arrest of descent. The patient understands the risks and  desires to proceed with the IOL.    Anemia  - last hgb 10.5  - T&C 1u p RBC   - f/up admission CBC      discussed with Dr Latasha Dodd MD  Labor and Delivery    lauren shamir Problems (from 10/18/23 to present)       Problem Noted Resolved    38 weeks gestation of pregnancy 2024 by Kyra Ochoa MD No    Priority:  Medium      Anemia in pregnancy 3/13/2024 by Jazmyn Medina MD No     Priority:  Medium      Obesity affecting pregnancy in third trimester 2/7/2024 by Jazmyn Medina MD No    Priority:  Medium      High risk multigravida in third trimester 10/17/2023 by Yumiko Thompson No    Priority:  Medium      GDM, class A2 10/17/2023 by Jus Peña MD No    Priority:  Medium      Overview Addendum 4/2/2024  9:17 AM by Lacy Ibarra, APRN-CNS     H/o GDMA2  Largest vaginal birth = 4kg  Starting A1C 4.5    [X] Shared Care with Dr. Medina  [X] Attended [employer]/met with nutrtionist  [X] MFM Consult completed  [] MFM 36 wk visit  [] Serial growth ultrasounds starting at 28 weeks    Last ultrasound: 28 wk pending    Fetal surveillance:  [] Weekly at 32 weeks  [] Twice weekly at 36 weeks    Current Regimen:  12/7/23: NPH 10 units at bedtime  12/11/2023: NPH 15 units at bedtime  1/2/24: NPH 20 at bedtime  1/8/2024 : no change,NPH 20 at bedtime  1/15/2024 no change,NPH 20 at bedtime   1/22/2024 : NPH 6/20 --> 6/24* at bedtime  1/29/2024 : NPH 6/24 am/pm  2/5/2024 : NPH 6/24 --> 6/28* am/pm, Lispro 4* with breakfast only  2/12/2024  NPH inulin before breakfast and before bed  6/28                     NOT taking Lispro Insulin at breakfast - has if needed while travel  2/26/2024 : NPH 6/28 am/pm, Lispro 4(PRN) /-/ 4* with breakfast and dinner  3/4/2024 : NPH 6/28 --> 10*/32* am/pm, Lispro 4(PRN)/ - / 8* with breakfast and dinner    3/11/2024 NPH inulin before breakfast and before bed  10/32 -> 14/32                    Lispro Insulin - prn 4 / - / 8 -> 4 prn /-/ 14  3/18/2024  NPH inulin before breakfast and before bed  14/32 -> 18/32                    Lispro Insulin - prn 4 / - / 8 -> 4 prn /-/ 14    3/27 - MFM visit NPH from 18/32-->18/30 Currently on lispro 4 as needed with breakfast and 14 units with dinner. If dinner elevated this evening will increase to 16 units with dinner.     4/2/2024 No change   NPH insulin 18/30  Lispro Insulin 4 as needed/-/14-16  Delivery scheduled  24        Delivery Plan:  Recommended gestational age: pending 36 week follow up visit  Intrapartum:  GDM protocol  Postpartum: No medication               21 weeks gestation of pregnancy 2023 by uJs Peña MD 2024 by Jazmyn Medina MD    Priority:  Medium              Subjective   Good fetal movement. Denies vaginal bleeding.          Obstetrical History   OB History    Para Term  AB Living   4 3 3     3   SAB IAB Ectopic Multiple Live Births           3      # Outcome Date GA Lbr Sanket/2nd Weight Sex Delivery Anes PTL Lv   4 Current            3 Term  39w0d  3.997 kg M    ARA      Complications: GDM, class A2   2 Term  39w0d  4.848 kg M CS-LTranv   ARA      Complications: Failure to Progress in First Stage   1 Term  39w0d  3.997 kg M Vag-Spont   ARA       Past Medical History  Past Medical History:   Diagnosis Date    Benign ovarian cyst 10/17/2023    DM (diabetes mellitus), gestational, postpartum condition 10/17/2023    Dyshidrosis (pompholyx) 07/10/2023    Lichen simplex chronicus 07/10/2023        Past Surgical History   Past Surgical History:   Procedure Laterality Date     SECTION, LOW TRANSVERSE         Medications  Medications Prior to Admission   Medication Sig Dispense Refill Last Dose    alcohol swabs pads, medicated Use 1, up to 5 times a day 200 each 3 2024    blood sugar diagnostic (OneTouch Ultra Test) strip Use 1 strip 4-6 times per day during pregnancy 150 each 3 2024    blood-glucose meter misc Test daily fasting and 1 hour after starting your meals. 1 each 0 2024    blood-glucose sensor (Dexcom G7 Sensor) device Use 1 sensor and change every 10 days 3 each 11 2024    cyanocobalamin (Vitamin B-12) 1,000 mcg tablet Take 1 tablet (1,000 mcg) by mouth once daily. 30 tablet 2 Unknown    ferrous sulfate, 325 mg ferrous sulfate, tablet Take 1 tablet by mouth once daily with breakfast. 30 tablet 2 Unknown    FreeStyle Kassandra reader (FreeStyle  "Kassandra 2 [place]) misc Use for monitoring with Libre2 sensor 1 each 0 4/4/2024    FreeStyle Kassandra sensor system (FreeStyle Kassandra 2 Sensor) kit Use 1 sensor and change every 14 days 2 each 11 4/4/2024    insulin lispro (HumaLOG KwikPen Insulin) 100 unit/mL injection Inject 4 units before breakfast daily PRN and 8 units At dinner . 5 each 3 4/4/2024    insulin NPH, Isophane, (HumuLIN N NPH Insulin KwikPen) 100 unit/mL (3 mL) injection Inject 10 at breakfast and 32 At Bedtime . May take up to 50 units BID during pregnancy 5 each 3 4/4/2024    isopropyl alcohoL 70 % towelette Test daily before all meals/snacks and once before bedtime. 1 each 0 4/4/2024    lancets (OneTouch UltraSoft Lancets) misc Use 1 lancet 4-6 times a day to test blood sugar during pregnancy 200 each 3 4/4/2024    metoclopramide (Reglan) 10 mg tablet Take 1 tablet (10 mg) by mouth 1 time if needed (nausea). 30 tablet 1     pen needle, diabetic 32 gauge x 5/32\" needle Use one for ea injection 100 each 11 4/4/2024    Prenatal Vitamin 27 mg iron- 0.8 mg tablet Take 1 tablet by mouth once daily.   4/4/2024    triamcinolone (Kenalog) 0.1 % ointment Apply topically 2 times a day. 60 g 2 Unknown       Allergies  Patient has no known allergies.     Family History  No family history on file.    Social History  Social History     Tobacco Use    Smoking status: Never     Passive exposure: Never    Smokeless tobacco: Never   Substance Use Topics    Alcohol use: Never     Substance and Sexual Activity   Drug Use Never       Objective    Last Vitals  Temp Pulse Resp BP MAP O2 Sat   36.8 °C (98.2 °F) 89   129/68   98 %     Physical Examination  HEENT: PERRLA. External ears normal. Nose normal, no erythema or discharge. Mouth and throat clear.  NECK: supple, no significant adenopathy  LUNGS:  normal work of breathing on room air   HEART:  regular rate   ABDOMEN: soft, gravid, nontender, nondistended, no abnormal masses, no epigastric pain  FHR is 130  , with mod " variability, accels, no decels , and a  cart  tracing.    [address] reading:  acontractile   The fetus is in a vertex presentation, determined by ultrasound  CERVIX: Closed cm dilated, 0 % effaced, -3 station; MEMBRANES are Intact  EXTREMITIES: no redness or tenderness in the calves or thighs, no edema  SKIN: normal coloration and turgor, no rashes  NEUROLOGICAL: alert, oriented, normal speech, no focal findings or movement disorder noted  PSYCHOLOGICAL: awake and alert; oriented to person, place, and time    Initial SVE closed, CRB attempted x2 times. unsuccessful. On reexamination, cervix 1.5cm. crb deferred. Will start pit and reassess    Lab Review  Labs in chart were reviewed.

## 2024-04-06 ENCOUNTER — ANESTHESIA (OUTPATIENT)
Dept: OBSTETRICS AND GYNECOLOGY | Facility: HOSPITAL | Age: 34
End: 2024-04-06
Payer: COMMERCIAL

## 2024-04-06 ENCOUNTER — ANESTHESIA EVENT (OUTPATIENT)
Dept: OBSTETRICS AND GYNECOLOGY | Facility: HOSPITAL | Age: 34
End: 2024-04-06
Payer: COMMERCIAL

## 2024-04-06 LAB
GLUCOSE BLD MANUAL STRIP-MCNC: 80 MG/DL (ref 74–99)
GLUCOSE BLD MANUAL STRIP-MCNC: 89 MG/DL (ref 74–99)

## 2024-04-06 PROCEDURE — 2500000004 HC RX 250 GENERAL PHARMACY W/ HCPCS (ALT 636 FOR OP/ED): Performed by: STUDENT IN AN ORGANIZED HEALTH CARE EDUCATION/TRAINING PROGRAM

## 2024-04-06 PROCEDURE — 7210000002 HC LABOR PER HOUR

## 2024-04-06 PROCEDURE — 10D17Z9 MANUAL EXTRACTION OF PRODUCTS OF CONCEPTION, RETAINED, VIA NATURAL OR ARTIFICIAL OPENING: ICD-10-PCS | Performed by: OBSTETRICS & GYNECOLOGY

## 2024-04-06 PROCEDURE — 7100000016 HC LABOR RECOVERY PER HOUR

## 2024-04-06 PROCEDURE — 1210000001 HC SEMI-PRIVATE ROOM DAILY

## 2024-04-06 PROCEDURE — 2500000001 HC RX 250 WO HCPCS SELF ADMINISTERED DRUGS (ALT 637 FOR MEDICARE OP): Performed by: STUDENT IN AN ORGANIZED HEALTH CARE EDUCATION/TRAINING PROGRAM

## 2024-04-06 PROCEDURE — 59409 OBSTETRICAL CARE: CPT | Performed by: OBSTETRICS & GYNECOLOGY

## 2024-04-06 PROCEDURE — 2500000005 HC RX 250 GENERAL PHARMACY W/O HCPCS: Performed by: STUDENT IN AN ORGANIZED HEALTH CARE EDUCATION/TRAINING PROGRAM

## 2024-04-06 PROCEDURE — 88307 TISSUE EXAM BY PATHOLOGIST: CPT | Performed by: PATHOLOGY

## 2024-04-06 PROCEDURE — 82947 ASSAY GLUCOSE BLOOD QUANT: CPT

## 2024-04-06 PROCEDURE — 10907ZC DRAINAGE OF AMNIOTIC FLUID, THERAPEUTIC FROM PRODUCTS OF CONCEPTION, VIA NATURAL OR ARTIFICIAL OPENING: ICD-10-PCS | Performed by: OBSTETRICS & GYNECOLOGY

## 2024-04-06 PROCEDURE — 88307 TISSUE EXAM BY PATHOLOGIST: CPT | Mod: TC,SUR | Performed by: STUDENT IN AN ORGANIZED HEALTH CARE EDUCATION/TRAINING PROGRAM

## 2024-04-06 RX ORDER — METHYLERGONOVINE MALEATE 0.2 MG/ML
0.2 INJECTION INTRAVENOUS ONCE AS NEEDED
Status: DISCONTINUED | OUTPATIENT
Start: 2024-04-06 | End: 2024-04-07 | Stop reason: HOSPADM

## 2024-04-06 RX ORDER — ADHESIVE BANDAGE
10 BANDAGE TOPICAL
Status: DISCONTINUED | OUTPATIENT
Start: 2024-04-06 | End: 2024-04-07 | Stop reason: HOSPADM

## 2024-04-06 RX ORDER — MISOPROSTOL 200 UG/1
800 TABLET ORAL ONCE AS NEEDED
Status: DISCONTINUED | OUTPATIENT
Start: 2024-04-06 | End: 2024-04-07 | Stop reason: HOSPADM

## 2024-04-06 RX ORDER — FENTANYL/BUPIVACAINE/NS/PF 2MCG/ML-.1
PLASTIC BAG, INJECTION (ML) INJECTION AS NEEDED
Status: DISCONTINUED | OUTPATIENT
Start: 2024-04-06 | End: 2024-04-16 | Stop reason: HOSPADM

## 2024-04-06 RX ORDER — ACETAMINOPHEN 325 MG/1
975 TABLET ORAL EVERY 6 HOURS
Status: DISCONTINUED | OUTPATIENT
Start: 2024-04-06 | End: 2024-04-07 | Stop reason: HOSPADM

## 2024-04-06 RX ORDER — OXYTOCIN/0.9 % SODIUM CHLORIDE 30/500 ML
60 PLASTIC BAG, INJECTION (ML) INTRAVENOUS ONCE AS NEEDED
Status: DISCONTINUED | OUTPATIENT
Start: 2024-04-06 | End: 2024-04-07 | Stop reason: HOSPADM

## 2024-04-06 RX ORDER — FENTANYL CITRATE 50 UG/ML
50 INJECTION, SOLUTION INTRAMUSCULAR; INTRAVENOUS ONCE
Status: DISCONTINUED | OUTPATIENT
Start: 2024-04-06 | End: 2024-04-06

## 2024-04-06 RX ORDER — OXYTOCIN 10 [USP'U]/ML
10 INJECTION, SOLUTION INTRAMUSCULAR; INTRAVENOUS ONCE AS NEEDED
Status: DISCONTINUED | OUTPATIENT
Start: 2024-04-06 | End: 2024-04-07 | Stop reason: HOSPADM

## 2024-04-06 RX ORDER — ENOXAPARIN SODIUM 100 MG/ML
40 INJECTION SUBCUTANEOUS EVERY 24 HOURS
Status: DISCONTINUED | OUTPATIENT
Start: 2024-04-06 | End: 2024-04-07 | Stop reason: HOSPADM

## 2024-04-06 RX ORDER — IBUPROFEN 600 MG/1
600 TABLET ORAL EVERY 6 HOURS
Status: DISCONTINUED | OUTPATIENT
Start: 2024-04-06 | End: 2024-04-07 | Stop reason: HOSPADM

## 2024-04-06 RX ORDER — LIDOCAINE 560 MG/1
1 PATCH PERCUTANEOUS; TOPICAL; TRANSDERMAL
Status: DISCONTINUED | OUTPATIENT
Start: 2024-04-06 | End: 2024-04-07 | Stop reason: HOSPADM

## 2024-04-06 RX ORDER — MORPHINE SULFATE 2 MG/ML
2 INJECTION, SOLUTION INTRAMUSCULAR; INTRAVENOUS ONCE
Status: DISCONTINUED | OUTPATIENT
Start: 2024-04-06 | End: 2024-04-06

## 2024-04-06 RX ORDER — NIFEDIPINE 10 MG/1
10 CAPSULE ORAL ONCE AS NEEDED
Status: DISCONTINUED | OUTPATIENT
Start: 2024-04-06 | End: 2024-04-07 | Stop reason: HOSPADM

## 2024-04-06 RX ORDER — BISACODYL 10 MG/1
10 SUPPOSITORY RECTAL DAILY PRN
Status: DISCONTINUED | OUTPATIENT
Start: 2024-04-06 | End: 2024-04-07 | Stop reason: HOSPADM

## 2024-04-06 RX ORDER — LOPERAMIDE HYDROCHLORIDE 2 MG/1
4 CAPSULE ORAL EVERY 2 HOUR PRN
Status: DISCONTINUED | OUTPATIENT
Start: 2024-04-06 | End: 2024-04-07 | Stop reason: HOSPADM

## 2024-04-06 RX ORDER — FENTANYL/BUPIVACAINE/NS/PF 2MCG/ML-.1
PLASTIC BAG, INJECTION (ML) INJECTION CONTINUOUS PRN
Status: DISCONTINUED | OUTPATIENT
Start: 2024-04-06 | End: 2024-04-16 | Stop reason: HOSPADM

## 2024-04-06 RX ORDER — HYDRALAZINE HYDROCHLORIDE 20 MG/ML
5 INJECTION INTRAMUSCULAR; INTRAVENOUS ONCE AS NEEDED
Status: DISCONTINUED | OUTPATIENT
Start: 2024-04-06 | End: 2024-04-07 | Stop reason: HOSPADM

## 2024-04-06 RX ORDER — TRANEXAMIC ACID 100 MG/ML
1000 INJECTION, SOLUTION INTRAVENOUS ONCE AS NEEDED
Status: DISCONTINUED | OUTPATIENT
Start: 2024-04-06 | End: 2024-04-07 | Stop reason: HOSPADM

## 2024-04-06 RX ORDER — CARBOPROST TROMETHAMINE 250 UG/ML
250 INJECTION, SOLUTION INTRAMUSCULAR ONCE AS NEEDED
Status: DISCONTINUED | OUTPATIENT
Start: 2024-04-06 | End: 2024-04-07 | Stop reason: HOSPADM

## 2024-04-06 RX ORDER — POLYETHYLENE GLYCOL 3350 17 G/17G
17 POWDER, FOR SOLUTION ORAL 2 TIMES DAILY PRN
Status: DISCONTINUED | OUTPATIENT
Start: 2024-04-06 | End: 2024-04-07 | Stop reason: HOSPADM

## 2024-04-06 RX ORDER — LABETALOL HYDROCHLORIDE 5 MG/ML
20 INJECTION, SOLUTION INTRAVENOUS ONCE AS NEEDED
Status: DISCONTINUED | OUTPATIENT
Start: 2024-04-06 | End: 2024-04-07 | Stop reason: HOSPADM

## 2024-04-06 RX ORDER — DIPHENHYDRAMINE HYDROCHLORIDE 50 MG/ML
25 INJECTION INTRAMUSCULAR; INTRAVENOUS EVERY 6 HOURS PRN
Status: DISCONTINUED | OUTPATIENT
Start: 2024-04-06 | End: 2024-04-07 | Stop reason: HOSPADM

## 2024-04-06 RX ORDER — DIPHENHYDRAMINE HCL 25 MG
25 CAPSULE ORAL EVERY 6 HOURS PRN
Status: DISCONTINUED | OUTPATIENT
Start: 2024-04-06 | End: 2024-04-07 | Stop reason: HOSPADM

## 2024-04-06 RX ORDER — ONDANSETRON HYDROCHLORIDE 2 MG/ML
4 INJECTION, SOLUTION INTRAVENOUS EVERY 6 HOURS PRN
Status: DISCONTINUED | OUTPATIENT
Start: 2024-04-06 | End: 2024-04-07 | Stop reason: HOSPADM

## 2024-04-06 RX ORDER — ONDANSETRON 4 MG/1
4 TABLET, FILM COATED ORAL EVERY 6 HOURS PRN
Status: DISCONTINUED | OUTPATIENT
Start: 2024-04-06 | End: 2024-04-07 | Stop reason: HOSPADM

## 2024-04-06 RX ORDER — SIMETHICONE 80 MG
80 TABLET,CHEWABLE ORAL 4 TIMES DAILY PRN
Status: DISCONTINUED | OUTPATIENT
Start: 2024-04-06 | End: 2024-04-07 | Stop reason: HOSPADM

## 2024-04-06 RX ADMIN — ACETAMINOPHEN 975 MG: 325 TABLET ORAL at 09:27

## 2024-04-06 RX ADMIN — IBUPROFEN 600 MG: 600 TABLET, FILM COATED ORAL at 09:27

## 2024-04-06 RX ADMIN — IBUPROFEN 600 MG: 600 TABLET, FILM COATED ORAL at 21:07

## 2024-04-06 RX ADMIN — ACETAMINOPHEN 975 MG: 325 TABLET ORAL at 21:06

## 2024-04-06 RX ADMIN — ENOXAPARIN SODIUM 40 MG: 100 INJECTION SUBCUTANEOUS at 21:07

## 2024-04-06 RX ADMIN — IBUPROFEN 600 MG: 600 TABLET, FILM COATED ORAL at 15:27

## 2024-04-06 RX ADMIN — Medication 1 EACH: at 15:39

## 2024-04-06 RX ADMIN — ACETAMINOPHEN 975 MG: 325 TABLET ORAL at 15:27

## 2024-04-06 SDOH — ECONOMIC STABILITY: FOOD INSECURITY: WITHIN THE PAST 12 MONTHS, THE FOOD YOU BOUGHT JUST DIDN'T LAST AND YOU DIDN'T HAVE MONEY TO GET MORE.: NEVER TRUE

## 2024-04-06 SDOH — ECONOMIC STABILITY: FOOD INSECURITY: WITHIN THE PAST 12 MONTHS, YOU WORRIED THAT YOUR FOOD WOULD RUN OUT BEFORE YOU GOT MONEY TO BUY MORE.: NEVER TRUE

## 2024-04-06 ASSESSMENT — PAIN SCALES - GENERAL
PAINLEVEL_OUTOF10: 1
PAINLEVEL_OUTOF10: 0 - NO PAIN
PAINLEVEL_OUTOF10: 2
PAINLEVEL_OUTOF10: 0 - NO PAIN
PAINLEVEL_OUTOF10: 3
PAINLEVEL_OUTOF10: 0 - NO PAIN
PAINLEVEL_OUTOF10: 3
PAINLEVEL_OUTOF10: 0 - NO PAIN
PAINLEVEL_OUTOF10: 0 - NO PAIN

## 2024-04-06 ASSESSMENT — PAIN DESCRIPTION - DESCRIPTORS: DESCRIPTORS: ACHING

## 2024-04-06 ASSESSMENT — PAIN - FUNCTIONAL ASSESSMENT: PAIN_FUNCTIONAL_ASSESSMENT: 0-10

## 2024-04-06 ASSESSMENT — PAIN DESCRIPTION - LOCATION: LOCATION: ABDOMEN

## 2024-04-06 NOTE — CARE PLAN
The patient's goals for the shift include to have a safe delivery and healthy baby.    The clinical goals for the shift include pt will get some rest

## 2024-04-06 NOTE — SIGNIFICANT EVENT
Cervical Exam  Dilation: Closed  Effacement (%): 30  Fetal Station: -3  Method: Manual  OB Examiner: Fidelina KUMAR  Fetal Assessment  Movement: Present  Mode: External US  Baseline Fetal Heart Rate (bpm): 130 bpm  Baseline Classification: Normal  Variability: Moderate (Between 6 and 25 BPM)  Pattern: Accelerations      Contraction Frequency: 2-3    CRB placed. Pt tolerated procedure well. Continue Pitocin per protocol.    Discussed with Dr. Jd Kitchen MD  Labor and Delivery

## 2024-04-06 NOTE — DISCHARGE INSTRUCTIONS

## 2024-04-06 NOTE — CARE PLAN
Problem: Antepartum  Goal: Maintain pregnancy as long as maternal and/or fetal condition is stable  Outcome: Met  Goal: No decrease in circulation/VTE  Outcome: Met  Goal: FHR remains reassuring  Outcome: Met     Problem: Vaginal Birth or  Section  Goal: Fetal and maternal status remain reassuring during the birth process  Outcome: Met  Goal: Tolerate CRB for IOL placement maintenance until dislodgement/removal 12hrs after placement  Outcome: Met  Goal: Prevention of malpresentation/labor dystocia through delivery  Outcome: Met  Goal: Demonstrates labor coping techniques through delivery  Outcome: Met  Goal: Minimal s/sx of HDP and BP<160/110  Outcome: Met  Goal: No s/sx of infection through recovery  Outcome: Met     Problem: Pain - Adult  Goal: Verbalizes/displays adequate comfort level or baseline comfort level  Outcome: Met     Problem: Safety - Adult  Goal: Free from fall injury  Outcome: Met   The patient's goals for the shift include to have a safe delivery and healthy baby.    The clinical goals for the shift include Reassuring FHR and uterine activity

## 2024-04-06 NOTE — SIGNIFICANT EVENT
Cervical Exam  Dilation: 2  Effacement (%): 60  Fetal Station: -3  Method: Manual  OB Examiner: Fidelina KUMAR  Fetal Assessment  Movement: Present  Mode: External US  Baseline Fetal Heart Rate (bpm): 130 bpm  Baseline Classification: Normal  Variability: Moderate (Between 6 and 25 BPM)  Pattern: Accelerations      Contraction Frequency: 2-3.5    Attempted AROM; however, cervix remains high. Will reassess at next exam.    Discussed with Dr. Jd Kitchen MD  Labor and Delivery

## 2024-04-06 NOTE — ANESTHESIA PREPROCEDURE EVALUATION
Patient: Radha Haley  Patient: Radha Haley    Evaluation Method: In-person visit    Procedure Information    Date: 24  Procedure: Labor Consult         Relevant Problems   Endocrine   (+) GDM, class A2   (+) Obesity affecting pregnancy in third trimester      Hematology   (+) Anemia in pregnancy      GYN   (+) 38 weeks gestation of pregnancy   (+) High risk multigravida in third trimester       Clinical information reviewed:    Allergies  Meds               NPO Detail:  No data recorded     OB/Gyn Evaluation    Present Pregnancy    Patient is pregnant now.  (+) , previous  section   Obstetric History                Physical Exam    Airway  Mallampati: II  TM distance: >3 FB  Neck ROM: full     Cardiovascular    Dental - normal exam     Pulmonary    Abdominal            Anesthesia Plan    History of general anesthesia?: yes  History of complications of general anesthesia?: no    ASA 2     epidural     Anesthetic plan and risks discussed with patient.  Use of blood products discussed with patient who.    Plan discussed with attending.

## 2024-04-06 NOTE — SIGNIFICANT EVENT
AROM    Cervical Exam  Dilation: 4  Effacement (%): 60  Fetal Station: -3  Method: Manual  OB Examiner: Jd KUMAR  Fetal Assessment  Movement: Present  Mode: Wireless Monitoring System  Baseline Fetal Heart Rate (bpm): 135 bpm  Baseline Classification: Normal  Variability: Moderate (Between 6 and 25 BPM)  Pattern: Accelerations      Contraction Frequency: 2-8    AROM'd for clear fluid by Dr. Miles. Pt positioned in dorsal lithotomy to reach high posterior cervix. Tolerated well.    Saurav Kitchen MD  Labor and Delivery

## 2024-04-06 NOTE — L&D DELIVERY NOTE
OB Delivery Note  2024  Radhastacey Haley  33 y.o.   Vaginal, Spontaneous      , 5 cm -> 10 cm within minutes with rapid delivery thereafter, placental delivery notable for cord avulsion, portion of placenta removed manually, able to deliver remainder spontaneously with active management of 3rd stage of labor, confirmed complete placenta removed with US and inspection of placenta.     Gestational Age: 38w2d  /Para:   Quantitative Blood Loss: Admission to Discharge: 302 mL (2024  4:24 PM - 2024 12:01 PM)    Sudha Rasheeda [[id number]]      Labor Events    Rupture date/time: 2024 0547  Rupture type: Artificial  Fluid color: Clear  Fluid odor: None  Labor type: Induced Onset of Labor  Labor allowed to proceed with plans for an attempted vaginal birth?: Yes  Induction: AROM, Shultz/EASI, Oxytocin  Induction indications: Diabetes  Complications: None       Labor Event Times    Labor onset date/time: 2024 1624  Dilation complete date/time: 2024 0824  Start pushing date/time: 2024 0822       Labor Length    1st stage: 16h 00m  2nd stage: 0h 00m  3rd stage: 0h 23m       Placenta    Placenta delivery date/time: 2024 0847  Placenta removal: Manual removal  Placenta appearance: Intact  Placenta disposition: pathology       Cord    Vessels: 3 vessels  Complications: None  Delayed cord clamping?: Yes  Cord clamped date/time: 2024 0825  Cord blood disposition: Discarded  Gases sent?: No  Stem cell collection (by provider): Yes       Lacerations    Episiotomy: None  Perineal laceration: None       Anesthesia    Method: None       Operative Delivery    Forceps attempted?: No  Vacuum extractor attempted?: No       Shoulder Dystocia    Shoulder dystocia present?: No       [place] Delivery    Birth date/time: 2024 08:24:00  Delivery type: Vaginal, Spontaneous  Complications: None       Resuscitation    Method: Suctioning, Tactile stimulation       Apgars    Living status: Living  Apgar  Component Scores:  1 min.:  5 min.:  10 min.:  15 min.:  20 min.:    Skin color:  1  1       Heart rate:  2  2       Reflex irritability:  2  2       Muscle tone:  1  2       Respiratory effort:  2  2       Total:  8  9       Apgars assigned by: BARRY BARROW (1 MINUTE) / VENKATESH MEJIA (5 MINUTE)       Delivery Providers    Delivering clinician: Corinne A Bazella, MD   Provider Role    Anne Marie Lemons RN Delivery Nurse    Rand Mejia, [facility] Nursery Nurse    Mellissa Avila MD Resident                 Mellissa Avila MD

## 2024-04-07 VITALS
OXYGEN SATURATION: 95 % | SYSTOLIC BLOOD PRESSURE: 122 MMHG | BODY MASS INDEX: 35.27 KG/M2 | WEIGHT: 206.57 LBS | RESPIRATION RATE: 16 BRPM | TEMPERATURE: 97.5 F | HEART RATE: 78 BPM | DIASTOLIC BLOOD PRESSURE: 81 MMHG | HEIGHT: 64 IN

## 2024-04-07 LAB — GLUCOSE BLD MANUAL STRIP-MCNC: 123 MG/DL (ref 74–99)

## 2024-04-07 PROCEDURE — 2500000001 HC RX 250 WO HCPCS SELF ADMINISTERED DRUGS (ALT 637 FOR MEDICARE OP): Performed by: STUDENT IN AN ORGANIZED HEALTH CARE EDUCATION/TRAINING PROGRAM

## 2024-04-07 RX ORDER — IBUPROFEN 600 MG/1
600 TABLET ORAL EVERY 6 HOURS PRN
Qty: 120 TABLET | Refills: 0 | Status: SHIPPED | OUTPATIENT
Start: 2024-04-07 | End: 2024-05-07

## 2024-04-07 RX ORDER — DOCUSATE SODIUM 100 MG/1
100 CAPSULE, LIQUID FILLED ORAL 2 TIMES DAILY PRN
Qty: 60 CAPSULE | Refills: 0 | Status: SHIPPED | OUTPATIENT
Start: 2024-04-07 | End: 2024-05-07

## 2024-04-07 RX ORDER — ACETAMINOPHEN 500 MG
1000 TABLET ORAL EVERY 6 HOURS PRN
Qty: 120 TABLET | Refills: 0 | Status: SHIPPED | OUTPATIENT
Start: 2024-04-07

## 2024-04-07 RX ORDER — B-COMPLEX WITH VITAMIN C
1 TABLET ORAL DAILY
Qty: 30 TABLET | Refills: 11 | Status: SHIPPED | OUTPATIENT
Start: 2024-04-07 | End: 2025-04-07

## 2024-04-07 RX ADMIN — ACETAMINOPHEN 975 MG: 325 TABLET ORAL at 03:28

## 2024-04-07 RX ADMIN — ACETAMINOPHEN 975 MG: 325 TABLET ORAL at 16:08

## 2024-04-07 RX ADMIN — IBUPROFEN 600 MG: 600 TABLET, FILM COATED ORAL at 03:29

## 2024-04-07 RX ADMIN — ACETAMINOPHEN 975 MG: 325 TABLET ORAL at 09:33

## 2024-04-07 RX ADMIN — IBUPROFEN 600 MG: 600 TABLET, FILM COATED ORAL at 16:08

## 2024-04-07 RX ADMIN — IBUPROFEN 600 MG: 600 TABLET, FILM COATED ORAL at 09:32

## 2024-04-07 ASSESSMENT — PAIN SCALES - GENERAL
PAIN_LEVEL: 0
PAINLEVEL_OUTOF10: 2
PAINLEVEL_OUTOF10: 3
PAINLEVEL_OUTOF10: 0 - NO PAIN

## 2024-04-07 ASSESSMENT — PAIN - FUNCTIONAL ASSESSMENT: PAIN_FUNCTIONAL_ASSESSMENT: 0-10

## 2024-04-07 ASSESSMENT — PAIN DESCRIPTION - LOCATION: LOCATION: ABDOMEN

## 2024-04-07 ASSESSMENT — ACTIVITIES OF DAILY LIVING (ADL): LACK_OF_TRANSPORTATION: NO

## 2024-04-07 NOTE — CARE PLAN
The patient's goals for the shift include pt will have stable VS  The clinical goals for the shift include pt will get some rest

## 2024-04-07 NOTE — DISCHARGE SUMMARY
Discharge Summary    Admission Date: 2024  Discharge Date: 24  Discharge Diagnosis: 38 weeks gestation of pregnancy     Patient Active Problem List   Diagnosis    High risk multigravida in third trimester    Uterine scar from previous  delivery    GDM, class A2    Need for Tdap vaccination    Obesity affecting pregnancy in third trimester    Anemia in pregnancy    38 weeks gestation of pregnancy       Hospital Course  Radha Haley is a 33 y.o.,     Initially presented for: TOLAC/IOL in s/o A2DM and LGA growth pattern.    Admission Date: 2024    Delivery Date: 2024  8:24 AM     Delivery type: Vaginal, Spontaneous      GA at delivery: 38w2d    Outcome: Living     Anesthesia during delivery: None     Intrapartum complications: None     Feeding method: Breastfeeding Status: Yes    Contraception: Defers contraception to primary OB/PP visit. We discussed pregnancy spacing of at least one year, abstaining from intercourse for 6wks, and the ability to become pregnant in the absence of regular menses. Pt verbalized understanding.     Rhogam: The patient's blood type is A POS. Rhogam is not indicated.     Now postpartum day: 1.    Hospital course n/f:  Pt desires DC today.    PP course uneventful.  Meeting all postpartum milestones- ambulating independently, passing flatus, tolerating PO intake, lochia light, voiding spontaneously, and pain well controlled with PO meds.     Dispo  OK for DC today per pt preference.  6 week postpartum follow-up with prenatal provider.     Pertinent Physical Exam At Time of Discharge  General: well appearing, well nourished, postpartum  Obstetric: fundus firm below umbilicus, lochia light  Skin: Warm, dry; no rashes/lesions/erythema  Breast: No masses, nipple discharge  Neuro: A/Ox3, conversational, no gross motor deficit   GI: no distension, appropriately tender, soft, +BS  Respiratory: Even and unlabored on RA, LSCTA BL  Cardiovascular: No BLE edema; No  "erythema, warmth  Psych: appropriate mood and affect       Your medication list        START taking these medications        Instructions Last Dose Given Next Dose Due   acetaminophen 500 mg tablet  Commonly known as: Tylenol      Take 2 tablets (1,000 mg) by mouth every 6 hours if needed for moderate pain (4 - 6).       docusate sodium 100 mg capsule  Commonly known as: Colace      Take 1 capsule (100 mg) by mouth 2 times a day as needed for constipation.       ibuprofen 600 mg tablet      Take 1 tablet (600 mg) by mouth every 6 hours if needed for moderate pain (4 - 6) (pain).              CONTINUE taking these medications        Instructions Last Dose Given Next Dose Due   alcohol swabs pads, medicated      Use 1, up to 5 times a day       blood-glucose meter misc      Test daily fasting and 1 hour after starting your meals.       cyanocobalamin 1,000 mcg tablet  Commonly known as: Vitamin B-12      Take 1 tablet (1,000 mcg) by mouth once daily.       Dexcom G7 Sensor device  Generic drug: blood-glucose sensor      Use 1 sensor and change every 10 days       ferrous sulfate (325 mg ferrous sulfate) tablet      Take 1 tablet by mouth once daily with breakfast.       FreeStyle Kassandra 2 [place] misc  Generic drug: flash glucose scanning reader      Use for monitoring with Libre2 sensor       FreeStyle Kassandra 2 Sensor kit  Generic drug: flash glucose sensor kit      Use 1 sensor and change every 14 days       isopropyl alcohoL 70 % towelette      Test daily before all meals/snacks and once before bedtime.       lancets misc  Commonly known as: OneTouch UltraSoft Lancets      Use 1 lancet 4-6 times a day to test blood sugar during pregnancy       pen needle, diabetic 32 gauge x 5/32\" needle      Use one for ea injection       Prenatal Vitamin 27 mg iron-800 mcg folic acid tablet  Generic drug: prenatal vitamin (iron-folic)      Take 1 tablet by mouth once daily.       triamcinolone 0.1 % ointment  Commonly known as: " Kenalog      Apply topically 2 times a day.              STOP taking these medications      HumuLIN N NPH Insulin KwikPen 100 unit/mL (3 mL) injection  Generic drug: insulin NPH (Isophane)        insulin lispro 100 unit/mL injection  Commonly known as: HumaLOG KwikPen Insulin        metoclopramide 10 mg tablet  Commonly known as: Reglan        OneTouch Ultra Test strip  Generic drug: blood sugar diagnostic                  Where to Get Your Medications        These medications were sent to [facility]/pharmacy #[id number] - [facility] [address] AT [facility]  [address] [place] [id number]      Phone: [phone]   acetaminophen 500 mg tablet  docusate sodium 100 mg capsule  ibuprofen 600 mg tablet  Prenatal Vitamin 27 mg iron-800 mcg folic acid tablet           Outpatient Follow-Up  Future Appointments   Date Time Provider Department [place]   5/9/2024 10:00 AM CHANDNI Cevallos-CNP [id number] [facility]   5/22/2024  9:30 AM Jazmyn Medina MD [id number] [facility]       CHANDNI Gmoez-CNP

## 2024-04-07 NOTE — LACTATION NOTE
Lactation Consultant Note  Lactation Consultation  Reason for Consult: Initial assessment, Other (Comment) (patient request)  Consultant Name: Meghana Aceves RN, IBCLC    Maternal Information  Has mother  before?: Yes  How long did the mother previously breastfeed?: difficulty with all three older babies -  Previous Maternal Breastfeeding Challenges: Difficult latch, Low milk supply  Infant to breast within first 2 hours of birth?: Yes  Exclusive Pump and Bottle Feed: No    Maternal Assessment  Breast Assessment: Large, Symmetrical, Compressible, Other (Comment) (expressible)  Nipple Assessment: Intact, Erect, Bruised, Red/inflamed, Sore  Alterations in Nipple Condition: Stage I - pain or irritation with no skin break down  Areola Assessment: Normal    Infant Assessment  Infant Behavior: Feeding cues observed, Suckles on and off, needs stimulation, Readiness to feed  Infant Assessment: Palate - high/arch/bubble/normal, Good cupping of tongue, Tongue humped/bunched/retracted/elevated, Tongue protrudes over alveolar ridge    Feeding Assessment  Nutrition Source: Breastmilk, Formula (per mother’s request)  Feeding Method: Nursing at the breast, Paced bottle  Feeding Position: Cross - cradle, C - hold, Cradle, Skin to skin, One side per feeding, Nipple to nose, Mother needs assistance with latch/positioning  Suck/Feeding: Sustained, Coordinated suck/swallow/breathe, Tactile stimulation needed, Audible swallowing with stimulaton  Latch Assessment: Minimal assistance is needed, Instructed on deep latch, Areolar attachment, Deep latch obtained, Optimal angle of mouth opening, Wide open mouth < 160, Latch is painful, Flanged lips, Comfortable latch, Other (Comment) (initial latch painful but, then comfortable.)    LATCH TOOL  Latch: Grasps breast, tongue down, lips flanged, rhythmic sucking  Audible Swallowing: A few with stimulation  Type of Nipple: Everted (After stimulation)  Comfort (Breast/Nipple):  Soft/non-tender  Hold (Positioning): Minimal assist, teach one side, mother does other, staff holds  LATCH Score: 8    Breast Pump  Pump: Hospital grade electric pump, Double breast pumping, Massage, Hand expression  Frequency: Other (comment) (if supplementing- encouraged her to pump)  Duration: Initiate phase  Breast Shield Size and Type: 21 mm, Other (comment) (measured to be 17 MM- advised to use 18 MM/ 19 MM/ or 20 MM breast pump flanges- in hospital we have 21 MM)    Other OB Lactation Tools       Patient Follow-up  Outpatient Lactation Follow-up: Recommended    Other OB Lactation Documentation  Maternal Risk Factors: Diabetes (gestational, types 1 or 2)  Infant Risk Factors: Early term birth 37-39 weeks    Recommendations/Summary  In to talk to mom about pumping if supplementation and mom requesting a breast pump for at home.   Measured her to be 17 MM- advised to use 18 MM/ 19 MM/ or 20 MM breast flanges - here in the hospital we have 21 MM flanges.   Reviewed why the correct breast flange size is important for milk removal.     While I reviewing this information and answering questions the baby started to show feeding cues. Mom was receptive to latching the baby back to the breast.   Reviewed positioning of baby (in cross cradle hold)  use of pillow support, hand placement on baby and on breast, expression of colostrum to the nipple prior to latching, latching technique, and use of breast compression and stimulation to keep the baby feeding at the breast longer.      Deeper latch obtained- initial latch uncomfortable - but, then the latch was comfortable.   Noted swallows with stimulation.     Encouraged mom to breastfeed on demand with a goal of 8-12 feeds in a 24 hour period.   If baby is not showing feeding cues and it has been 3 hours since the last time the baby was fed or the last time the baby tired to feed, encouraged her to place baby in skin to skin.      Encouraged to call for assistance, if  needed.     Mom is anticipating discharge today.   Encouraged her to utilize the outpatient lactation resources, if needed.   Contact information given.   [phone] [facility] or [phone] [place]

## 2024-04-07 NOTE — ANESTHESIA POSTPROCEDURE EVALUATION
Patient: Radha Haley    Procedure Summary       Date: 04/06/24 Room / Location:     Anesthesia Start:  Anesthesia Stop:     Procedure: Labor Analgesia Diagnosis:     Scheduled Providers:  Responsible Provider:     Anesthesia Type: epidural ASA Status: 2            Anesthesia Type: No value filed.  Vitals:    04/07/24 0733   BP: 102/68   Pulse: 75   Resp: 16   Temp: 36.1 °C (97 °F)   SpO2: 97%     Neuraxial site assessed. No visible redness or swelling. Pain acceptably controlled. Patient able to ambulate and move all extremities without difficulty. Able to void. No complaints of nausea/vomiting. Tolerating PO intake well. No s/sx of PDPH.     Neuraxial site without redness, drainage, swelling.  Neuromuscular block resolved.      Anesthesia Post Evaluation    Patient location during evaluation: bedside  Patient participation: complete - patient participated  Level of consciousness: awake  Pain score: 0  Pain management: adequate  Airway patency: patent  Cardiovascular status: acceptable  Respiratory status: acceptable  Hydration status: acceptable  Postoperative Nausea and Vomiting: none        No notable events documented.

## 2024-04-08 LAB
BLOOD EXPIRATION DATE: NORMAL
DISPENSE STATUS: NORMAL
PRODUCT BLOOD TYPE: 6200
PRODUCT CODE: NORMAL
UNIT ABO: NORMAL
UNIT NUMBER: NORMAL
UNIT RH: NORMAL
UNIT VOLUME: 350
XM INTEP: NORMAL

## 2024-04-09 LAB
LABORATORY COMMENT REPORT: NORMAL
PATH REPORT.FINAL DX SPEC: NORMAL
PATH REPORT.GROSS SPEC: NORMAL
PATH REPORT.RELEVANT HX SPEC: NORMAL
PATH REPORT.TOTAL CANCER: NORMAL

## 2024-04-10 ENCOUNTER — APPOINTMENT (OUTPATIENT)
Dept: OBSTETRICS AND GYNECOLOGY | Facility: CLINIC | Age: 34
End: 2024-04-10
Payer: COMMERCIAL

## 2024-04-10 ENCOUNTER — APPOINTMENT (OUTPATIENT)
Dept: RADIOLOGY | Facility: CLINIC | Age: 34
End: 2024-04-10
Payer: COMMERCIAL

## 2024-04-17 ENCOUNTER — APPOINTMENT (OUTPATIENT)
Dept: OBSTETRICS AND GYNECOLOGY | Facility: CLINIC | Age: 34
End: 2024-04-17
Payer: COMMERCIAL

## 2024-04-17 ENCOUNTER — APPOINTMENT (OUTPATIENT)
Dept: RADIOLOGY | Facility: CLINIC | Age: 34
End: 2024-04-17
Payer: COMMERCIAL

## 2024-05-02 ENCOUNTER — APPOINTMENT (OUTPATIENT)
Dept: HEMATOLOGY/ONCOLOGY | Facility: CLINIC | Age: 34
End: 2024-05-02
Payer: COMMERCIAL

## 2024-05-02 ENCOUNTER — LAB (OUTPATIENT)
Dept: LAB | Facility: LAB | Age: 34
End: 2024-05-02
Payer: COMMERCIAL

## 2024-05-02 DIAGNOSIS — O99.013 ANEMIA DURING PREGNANCY IN THIRD TRIMESTER (HHS-HCC): ICD-10-CM

## 2024-05-02 DIAGNOSIS — O99.019: ICD-10-CM

## 2024-05-02 DIAGNOSIS — O99.013 ANEMIA AFFECTING PREGNANCY IN THIRD TRIMESTER (HHS-HCC): ICD-10-CM

## 2024-05-02 DIAGNOSIS — O09.43 HIGH RISK MULTIGRAVIDA IN THIRD TRIMESTER (HHS-HCC): ICD-10-CM

## 2024-05-02 LAB
ALBUMIN SERPL BCP-MCNC: 4 G/DL (ref 3.4–5)
ALP SERPL-CCNC: 89 U/L (ref 33–110)
ALT SERPL W P-5'-P-CCNC: 30 U/L (ref 7–45)
ANION GAP SERPL CALC-SCNC: 12 MMOL/L (ref 10–20)
AST SERPL W P-5'-P-CCNC: 16 U/L (ref 9–39)
BASOPHILS # BLD AUTO: 0.03 X10*3/UL (ref 0–0.1)
BASOPHILS NFR BLD AUTO: 0.5 %
BILIRUB SERPL-MCNC: 0.4 MG/DL (ref 0–1.2)
BUN SERPL-MCNC: 14 MG/DL (ref 6–23)
CALCIUM SERPL-MCNC: 8.6 MG/DL (ref 8.6–10.6)
CHLORIDE SERPL-SCNC: 108 MMOL/L (ref 98–107)
CO2 SERPL-SCNC: 26 MMOL/L (ref 21–32)
CREAT SERPL-MCNC: 0.63 MG/DL (ref 0.5–1.05)
EGFRCR SERPLBLD CKD-EPI 2021: >90 ML/MIN/1.73M*2
EOSINOPHIL # BLD AUTO: 0.16 X10*3/UL (ref 0–0.7)
EOSINOPHIL NFR BLD AUTO: 2.7 %
ERYTHROCYTE [DISTWIDTH] IN BLOOD BY AUTOMATED COUNT: 12.7 % (ref 11.5–14.5)
FERRITIN SERPL-MCNC: 95 NG/ML (ref 8–150)
GLUCOSE SERPL-MCNC: 79 MG/DL (ref 74–99)
HCT VFR BLD AUTO: 39.2 % (ref 36–46)
HGB BLD-MCNC: 12.7 G/DL (ref 12–16)
IMM GRANULOCYTES # BLD AUTO: 0.02 X10*3/UL (ref 0–0.7)
IMM GRANULOCYTES NFR BLD AUTO: 0.3 % (ref 0–0.9)
IRON SATN MFR SERPL: 36 % (ref 25–45)
IRON SERPL-MCNC: 138 UG/DL (ref 35–150)
LYMPHOCYTES # BLD AUTO: 1.84 X10*3/UL (ref 1.2–4.8)
LYMPHOCYTES NFR BLD AUTO: 31.3 %
MCH RBC QN AUTO: 31.1 PG (ref 26–34)
MCHC RBC AUTO-ENTMCNC: 32.4 G/DL (ref 32–36)
MCV RBC AUTO: 96 FL (ref 80–100)
MONOCYTES # BLD AUTO: 0.37 X10*3/UL (ref 0.1–1)
MONOCYTES NFR BLD AUTO: 6.3 %
NEUTROPHILS # BLD AUTO: 3.46 X10*3/UL (ref 1.2–7.7)
NEUTROPHILS NFR BLD AUTO: 58.9 %
NRBC BLD-RTO: 0 /100 WBCS (ref 0–0)
PLATELET # BLD AUTO: 154 X10*3/UL (ref 150–450)
POTASSIUM SERPL-SCNC: 3.8 MMOL/L (ref 3.5–5.3)
PROT SERPL-MCNC: 6.2 G/DL (ref 6.4–8.2)
RBC # BLD AUTO: 4.08 X10*6/UL (ref 4–5.2)
SODIUM SERPL-SCNC: 142 MMOL/L (ref 136–145)
TIBC SERPL-MCNC: 388 UG/DL (ref 240–445)
UIBC SERPL-MCNC: 250 UG/DL (ref 110–370)
WBC # BLD AUTO: 5.9 X10*3/UL (ref 4.4–11.3)

## 2024-05-02 PROCEDURE — 36415 COLL VENOUS BLD VENIPUNCTURE: CPT

## 2024-05-02 PROCEDURE — 82728 ASSAY OF FERRITIN: CPT

## 2024-05-02 PROCEDURE — 80053 COMPREHEN METABOLIC PANEL: CPT

## 2024-05-02 PROCEDURE — 83540 ASSAY OF IRON: CPT

## 2024-05-02 PROCEDURE — 85025 COMPLETE CBC W/AUTO DIFF WBC: CPT

## 2024-05-02 PROCEDURE — 83550 IRON BINDING TEST: CPT

## 2024-05-09 ENCOUNTER — TELEMEDICINE (OUTPATIENT)
Dept: HEMATOLOGY/ONCOLOGY | Facility: CLINIC | Age: 34
End: 2024-05-09
Payer: COMMERCIAL

## 2024-05-09 DIAGNOSIS — O99.019: ICD-10-CM

## 2024-05-09 DIAGNOSIS — O09.43 HIGH RISK MULTIGRAVIDA IN THIRD TRIMESTER (HHS-HCC): ICD-10-CM

## 2024-05-09 DIAGNOSIS — O99.013 ANEMIA DURING PREGNANCY IN THIRD TRIMESTER (HHS-HCC): ICD-10-CM

## 2024-05-09 DIAGNOSIS — O99.013 ANEMIA AFFECTING PREGNANCY IN THIRD TRIMESTER (HHS-HCC): ICD-10-CM

## 2024-05-09 PROCEDURE — 3008F BODY MASS INDEX DOCD: CPT

## 2024-05-09 PROCEDURE — 99213 OFFICE O/P EST LOW 20 MIN: CPT

## 2024-05-09 NOTE — PROGRESS NOTES
"[facility] [place]    PATIENT VISIT INFORMATION    Visit Type: Follow Up Virtual   I performed this visit using real-time telehealth tools, including an audio/video connection between (Radha Haley at her home) and (Soledad Whitfield, ALEJANDRO at [facility]).  Patient consents to telemedicine service today and understands the limitations of the visit, no physical examination and all issues may not be able to be addressed today, other than brief neuro and psych assessment.  Referring Provider: Jazmyn Medina MD  Reason for referral: Anemia in pregnancy   Primary Practice Provider: Betty Mart MD    HEMATOLOGY HISTORY    Anemia started 1/15/2024 hemoglobin 10.2, 12 321.    2/7/2024 hemoglobin of 10.  Referring physician felt alpha thalassemia may be playing a role.  Iron 10% TSAT, spike in TIBC and UIBC, and ferritin 42.    3/13/2024 hemoglobin of 10.2.  B12 286.  3/28/2024 hgb 10.5, platelets 133, iron no Tsat, ferritin 40, albumin 3.3, calcium 8.3, and total protein 5.8.   HISTORY OF PRESENT ILLNESS     ID Statement: Radha Haley is a 33 year old female     Chief Complaint: Anemia Evaluation in pregnancy     Interval History:   Radha originally presented for evaluation to assess anemia in pregnancy.  She is status post one venofer IV iron infusion, she received on April 5, 2024.  She had a spontaneous natural vaginal delivery a few days later.  She denies any concerns.  She feels good.    Previously: She states she feels well and has no symptoms than what she considers \"normal pregnancy symptoms.\"  Some minor fatigue and once in a wh 3 days later.ile fast heartbeat. She does note a Shigella infection 2-3 months ago when the whole household was ill with the bacterial infection. Diarrhea at that time. Resolved. Scheduled to be induced next Friday. On insulin for gestational diabetes.     Denies fever, night sweats, shortness of breath, chest pain, abdominal pain, abnormal stools, no " D/N/V/C, blood in stool or melena, no urinary concerns, no neuropathy, swelling, bleeding from any location, no bruising, no lymphadenopathy, rashes, or pain.     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     OCCUPATIONAL/ENVIRONMENTAL HISTORY/EXPOSURES:  -None reported    Active Problems, Allergy List, Medication List, and PMH/PSH/FH/Social Hx have been reviewed and reconciled in chart. Updates made when necessary.     REVIEW OF SYSTEMS   A review of systems has been completed and are negative for complaints except what is stated in the assessment, HPI, IH, ROS, and/or past medical history.    ALLERGIES AND MEDICATIONS     Allergies and Intolerances:   No Known Allergies   Medication Profile:   Current Outpatient Medications   Medication Instructions    acetaminophen (TYLENOL) 1,000 mg, oral, Every 6 hours PRN    alcohol swabs pads, medicated Use 1, up to 5 times a day    blood-glucose meter misc Test daily fasting and 1 hour after starting your meals.    blood-glucose sensor (Dexcom G7 Sensor) device Use 1 sensor and change every 10 days    cyanocobalamin (VITAMIN B-12) 1,000 mcg, oral, Daily    ferrous sulfate, 325 mg ferrous sulfate, tablet 1 tablet, oral, Daily with breakfast    FreeStyle Kassandra reader (FreeStyle Kassandra 2 Comstock) misc Use for monitoring with Libre2  "sensor    FreeStyle Kassandra sensor system (FreeStyle Kassandra 2 Sensor) kit Use 1 sensor and change every 14 days    isopropyl alcohoL 70 % towelette Test daily before all meals/snacks and once before bedtime.    lancets (OneTouch UltraSoft Lancets) misc Use 1 lancet 4-6 times a day to test blood sugar during pregnancy    pen needle, diabetic 32 gauge x 5/32\" needle Use one for ea injection    Prenatal Vitamin 27 mg iron- 0.8 mg tablet 1 tablet, oral, Daily    triamcinolone (Kenalog) 0.1 % ointment Topical, 2 times daily      Available Vaccination Record:   Immunization History   Administered Date(s) Administered    Flu vaccine (IIV4), preservative free *Check age/dose* 11/05/2023    Pneumococcal Conjugate PCV 7 1991    Tdap vaccine, age 7 year and older (BOOSTRIX, ADACEL) 02/07/2024      PHYSICAL EXAM     Vital Signs/Measurements:       4/6/2024    12:36 PM 4/6/2024     4:21 PM 4/6/2024     7:49 PM 4/7/2024    12:20 AM 4/7/2024     3:51 AM 4/7/2024     7:33 AM 4/7/2024    11:40 AM   Vitals   Systolic 108 110 137 103 108 102 122   Diastolic 65 76 78 64 78 68 81   Heart Rate 77 72 81 73 73 75 78   Temp 37 °C (98.6 °F) 36.7 °C (98.1 °F) 36.4 °C (97.5 °F) 36.5 °C (97.7 °F) 36.7 °C (98.1 °F) 36.1 °C (97 °F) 36.4 °C (97.5 °F)   Resp 18 18 17 18 18 16 16        Performance:   ECOG Performance Status: 0     Grade ECOG performance status   0 Fully active, able to carry on all pre-disease performance without restriction   1 Restricted in physically strenuous activity but ambulatory and able to carry out work of a light or sedentary nature, e.g., light housework, office work   2 Ambulatory and capable of all selfcare but unable to carry out any work activities; Up and about more than 50% of waking hours   3 Capable of only limited selfcare, confined to bed or chair more than 50% of waking hours   4 Completely disabled; Cannot carry out any selfcare; Totally confined to bed or chair   5 Dead     Physical Exam:  General: " Patient post pregnant female awake/alert/oriented x3, no distress, Nourished, hydrated, alert and cooperative, ambulating without difficulty.   Psychological: Intact recent and remote memory, judgement, and insight. Appropriate mood, affect, and behavior     RESULTS/DATA     Labs:   Lab Results   Component Value Date    WBC 5.9 05/02/2024    NEUTROABS 3.46 05/02/2024    IGABSOL 0.02 05/02/2024    LYMPHSABS 1.84 05/02/2024    MONOSABS 0.37 05/02/2024    EOSABS 0.16 05/02/2024    BASOSABS 0.03 05/02/2024    RBC 4.08 05/02/2024    MCV 96 05/02/2024    MCHC 32.4 05/02/2024    HGB 12.7 05/02/2024    HCT 39.2 05/02/2024     05/02/2024     Lab Results   Component Value Date    RETICCTPCT 3.4 (H) 03/28/2024      Lab Results   Component Value Date    CREATININE 0.63 05/02/2024    BUN 14 05/02/2024    EGFR >90 05/02/2024     05/02/2024    K 3.8 05/02/2024     (H) 05/02/2024    CO2 26 05/02/2024      Lab Results   Component Value Date    ALT 30 05/02/2024    AST 16 05/02/2024    ALKPHOS 89 05/02/2024    BILITOT 0.4 05/02/2024      Lab Results   Component Value Date    TSH 0.71 02/07/2024     Lab Results   Component Value Date    TSH 0.71 02/07/2024     Lab Results   Component Value Date    IRON 138 05/02/2024    TIBC 388 05/02/2024    FERRITIN 95 05/02/2024     Lab Results   Component Value Date     03/28/2024        Radiology/Studies:   -NA    ASSESSMENT/PLAN     Assessment and Plan:   #1. Anemia in Pregnancy   Anemia started 1/15/2024 hemoglobin 10.2 and  B12 321.  Hemoglobin today 10.5 platelets 133 no other abnormality on CBC.  Albumin and total protein low calcium 8.3 kidney function good.  Corrected reticulocyte count 2.5.  Iron panel showing no saturation, UIBC over 450 and TIBC unable to be calculated.  Ferritin is 40.  Notable iron deficiency anemia in pregnancy. Secondly, she had a gastrointestinal infection and is gestational diabetic.  Insulin dependent. Bilirubin direct and LDH normal.   Direct antiglobulin test normal.  Haptoglobin, hemoglobin identification, and homocystine in process normal.     April 1, 2024 IV iron infusion x1 300 mg.  Prior to delivery the iron infusion did increase her hemoglobin.  She will take oral iron and B12 daily.     Patient delivered her baby 4/6/2024.    Her most recent labs on May 2, 2024 show iron deficiency anemia is corrected.  She feels well.  Explained that the iron infusion could have corrected her levels, though may not have necessarily address the underlying issue.  Offered her to follow back care with hematology in 3 months.  She prefers to go to her primary as of now and will call if need appointment.      **Please follow with specialties as scheduled for other comorbidities and routine health screenings.**    I have reviewed the patient's medical record including provider notes, laboratory and testing results, imaging, and procedures available within the system and outside the system pertinent to patient care.     Follow up:    RTC:  - to be determined, patient will call to schedule if needs an appointment per her request    Medications:  -Ferrous sulfate 325 mg daily sent to pharmacy  -B12 1000 mg sent to pharmacy   -Venofer  as needed    Imaging/Testing:  -NA    Referral:  -PCP    Other Pertinent Appointments:  -OB/GYN 5/22/2024      Patient Discussion Summary:  Discussed plan of care in detail. Patient states understanding and in agreement. Answered all questions. They will call with any additional questions and/or concerns.    Thank you for allowing me to participate in your care. It was a pleasure meeting you.    Sincerely,  Soledad Whitfield, APRN-CNP       This document may have been written by voice recognition software.  Please request clarification if there is documentation error or clarification is needed.   Time based billing: Please see documentation within this specific encounter.

## 2024-05-22 ENCOUNTER — APPOINTMENT (OUTPATIENT)
Dept: OBSTETRICS AND GYNECOLOGY | Facility: CLINIC | Age: 34
End: 2024-05-22
Payer: COMMERCIAL

## 2024-05-23 ENCOUNTER — POSTPARTUM VISIT (OUTPATIENT)
Dept: OBSTETRICS AND GYNECOLOGY | Facility: CLINIC | Age: 34
End: 2024-05-23
Payer: COMMERCIAL

## 2024-05-23 VITALS
HEIGHT: 64 IN | HEART RATE: 70 BPM | WEIGHT: 187.8 LBS | BODY MASS INDEX: 32.06 KG/M2 | DIASTOLIC BLOOD PRESSURE: 72 MMHG | SYSTOLIC BLOOD PRESSURE: 112 MMHG

## 2024-05-23 DIAGNOSIS — N85.A UTERINE SCAR FROM PREVIOUS CESAREAN DELIVERY: ICD-10-CM

## 2024-05-23 DIAGNOSIS — Z30.430 ENCOUNTER FOR IUD INSERTION: Primary | ICD-10-CM

## 2024-05-23 DIAGNOSIS — O24.414 INSULIN CONTROLLED GESTATIONAL DIABETES MELLITUS (GDM) IN THIRD TRIMESTER (HHS-HCC): ICD-10-CM

## 2024-05-23 PROBLEM — Z3A.38 38 WEEKS GESTATION OF PREGNANCY (HHS-HCC): Status: RESOLVED | Noted: 2024-04-05 | Resolved: 2024-05-23

## 2024-05-23 PROBLEM — O09.43 HIGH RISK MULTIGRAVIDA IN THIRD TRIMESTER (HHS-HCC): Status: RESOLVED | Noted: 2023-10-17 | Resolved: 2024-05-23

## 2024-05-23 PROBLEM — O24.419 GDM, CLASS A2 (HHS-HCC): Status: RESOLVED | Noted: 2023-10-17 | Resolved: 2024-05-23

## 2024-05-23 PROBLEM — O99.213 OBESITY AFFECTING PREGNANCY IN THIRD TRIMESTER (HHS-HCC): Status: RESOLVED | Noted: 2024-02-07 | Resolved: 2024-05-23

## 2024-05-23 PROCEDURE — 58300 INSERT INTRAUTERINE DEVICE: CPT | Performed by: OBSTETRICS & GYNECOLOGY

## 2024-05-23 PROCEDURE — 99214 OFFICE O/P EST MOD 30 MIN: CPT | Mod: TH | Performed by: OBSTETRICS & GYNECOLOGY

## 2024-05-23 PROCEDURE — 2500000004 HC RX 250 GENERAL PHARMACY W/ HCPCS (ALT 636 FOR OP/ED): Performed by: OBSTETRICS & GYNECOLOGY

## 2024-05-23 RX ADMIN — COPPER 1 EACH: 313.4 INTRAUTERINE DEVICE INTRAUTERINE at 14:28

## 2024-05-23 ASSESSMENT — ENCOUNTER SYMPTOMS
ENDOCRINE NEGATIVE: 0
CONSTITUTIONAL NEGATIVE: 0
NEUROLOGICAL NEGATIVE: 0
ALLERGIC/IMMUNOLOGIC NEGATIVE: 0
PSYCHIATRIC NEGATIVE: 0
GASTROINTESTINAL NEGATIVE: 0
CARDIOVASCULAR NEGATIVE: 0
MUSCULOSKELETAL NEGATIVE: 0
RESPIRATORY NEGATIVE: 0
EYES NEGATIVE: 0
HEMATOLOGIC/LYMPHATIC NEGATIVE: 0

## 2024-05-23 ASSESSMENT — EDINBURGH POSTNATAL DEPRESSION SCALE (EPDS)
I HAVE BEEN ABLE TO LAUGH AND SEE THE FUNNY SIDE OF THINGS: NOT QUITE SO MUCH NOW
I HAVE FELT SAD OR MISERABLE: NOT VERY OFTEN
I HAVE BEEN ANXIOUS OR WORRIED FOR NO GOOD REASON: YES, SOMETIMES
I HAVE BEEN SO UNHAPPY THAT I HAVE HAD DIFFICULTY SLEEPING: NOT AT ALL
THINGS HAVE BEEN GETTING ON TOP OF ME: YES, SOMETIMES I HAVEN'T BEEN COPING AS WELL AS USUAL
I HAVE BEEN SO UNHAPPY THAT I HAVE BEEN CRYING: ONLY OCCASIONALLY
I HAVE BLAMED MYSELF UNNECESSARILY WHEN THINGS WENT WRONG: NO, NEVER
I HAVE FELT SCARED OR PANICKY FOR NO GOOD REASON: NO, NOT AT ALL
I HAVE LOOKED FORWARD WITH ENJOYMENT TO THINGS: AS MUCH AS I EVER DID
TOTAL SCORE: 7
THE THOUGHT OF HARMING MYSELF HAS OCCURRED TO ME: NEVER

## 2024-05-23 ASSESSMENT — PAIN SCALES - GENERAL: PAINLEVEL: 0-NO PAIN

## 2024-05-23 NOTE — PROGRESS NOTES
Assessment   IMPRESSIONS:  Thank you for coming to your postpartum visit. Everything seems to be recovering appropriately at this time. You are free to resume normal activity. Please don't hesitate to call us for breast complaints, abnormal bleeding, mood changes, or other concerning symptoms - we have many good treatment options for these issues.     Radha was seen today for postpartum follow-up.  Diagnoses and all orders for this visit:  Encounter for IUD insertion (Primary)  -     copper (Paragard) 380 square mm IUD 1 each  Postpartum care following vaginal delivery ([facility])  Uterine scar from previous  delivery  Insulin controlled gestational diabetes mellitus (GDM) in third trimester ([facility])  -     Glucose Stefanie, 2Hr Non-Pregnancy; Future    We look forward to seeing you again at your next scheduled visit in 3 months.    Jazmyn Medina MD    Subjective   33 y.o.  presenting for postpartum follow-up     Delivery Date: 24  GA at Delivery: 38+2  Type of Delivery:  with manual removal of placenta    Pregnancy notable for:  - A2DM, NPH ; lispro , (last growth 3/21 EFW 3841g >99%, AC >99%)   - Hx of  for arrest of dilation, 10#11oz followed by , MFMU 81.8%   - Anemia, hgb 10.5 (3/28)  - BMI 35    Concerns: Discussed negative birth experience. Would like IUD placed today.    Pain: controlled  Lacerations: none  Lochia: none  Menses: none yet  Sexual Intimacy: not yet with Boruch   Contraceptive Method: would like IUD placement today  Feeding Method: Formula. Had too much pain with engorgement.  Lactation Consult Needed?: sees [facility], and I recommended she see Dr. Taylor or Dr. Palacios specifically to discuss what could be improved with her next pregnancy    Birth Trauma: denies trauma but did not have a good birth experience, felt there was not provider continuity, communication was poor, birth plan was not respected, baby came quickly so no  "doctor in room for delivery, and placenta removal was not done expeditiously.   Bonding with Baby: well with GIRL Pina  Mood: appropriate    Objective   /72   Pulse 70   Ht 1.626 m (5' 4\")   Wt 85.2 kg (187 lb 12.8 oz)   BMI 32.24 kg/m²      General:   Alert and oriented, in no acute distress   Neck: Supple. No visible thyromegaly.    Breast/Axilla: Normal to palpation bilaterally without masses, skin changes, or nipple discharge.    Abdomen: Soft, non-tender, without masses or organomegaly   Vulva: Normal architecture without erythema, masses, or lesions.    Vagina: Normal mucosa without lesions, masses, or atrophy. No abnormal vaginal discharge.    Cervix: Normal without masses, lesions, or signs of cervicitis.    Uterus: Normal mobile, non-enlarged uterus    Adnexa: Normal without masses or lesions   Pelvic Floor No POP noted. No high tone pelvic floor    Psych Normal affect. Normal mood.      Paragard IUD for contraception.    I discussed that IUDs have an unintended pregnancy rate of approximately 1%, similar to permanent sterilization. Mechanism of action and duration of therapy was discussed. Expected side effects of pain and irregular spotting for the first 3-6 months were reviewed. We talked about risks such as expulsion, migration, perforation, and PID with untreated gonorrhea/chlamydia at the time of insertion. STI testing prior to IUD insertion was offered. Safe sexual practices were encouraged. All questions were answered.    Pt consents to IUD placement. Patient info provided.    Placed in dorsal lithotomy.  Bimanual performed - AV uterus.  Betadine prep performed.  Sounded to 9cm.  IUD inserted without difficulty.  Strings trimmed to 2cm from external os.      "

## 2024-06-03 ENCOUNTER — LAB (OUTPATIENT)
Dept: LAB | Facility: LAB | Age: 34
End: 2024-06-03
Payer: COMMERCIAL

## 2024-06-03 DIAGNOSIS — O24.414 INSULIN CONTROLLED GESTATIONAL DIABETES MELLITUS (GDM) IN THIRD TRIMESTER (HHS-HCC): ICD-10-CM

## 2024-06-03 LAB
GLUCOSE 2H P 75 G GLC PO SERPL-MCNC: 72 MG/DL
GLUCOSE P FAST SERPL-MCNC: 88 MG/DL

## 2024-06-03 PROCEDURE — 82950 GLUCOSE TEST: CPT

## 2024-06-03 PROCEDURE — 82947 ASSAY GLUCOSE BLOOD QUANT: CPT

## 2024-06-03 PROCEDURE — 36415 COLL VENOUS BLD VENIPUNCTURE: CPT

## 2024-08-07 ENCOUNTER — OFFICE VISIT (OUTPATIENT)
Dept: OBSTETRICS AND GYNECOLOGY | Facility: CLINIC | Age: 34
End: 2024-08-07
Payer: COMMERCIAL

## 2024-08-07 VITALS — WEIGHT: 198.4 LBS | BODY MASS INDEX: 34.06 KG/M2 | DIASTOLIC BLOOD PRESSURE: 79 MMHG | SYSTOLIC BLOOD PRESSURE: 120 MMHG

## 2024-08-07 DIAGNOSIS — Z12.4 CERVICAL CANCER SCREENING: ICD-10-CM

## 2024-08-07 DIAGNOSIS — Z01.419 WELL WOMAN EXAM WITH ROUTINE GYNECOLOGICAL EXAM: Primary | ICD-10-CM

## 2024-08-07 DIAGNOSIS — T83.32XA INTRAUTERINE CONTRACEPTIVE DEVICE THREADS LOST, INITIAL ENCOUNTER: ICD-10-CM

## 2024-08-07 PROCEDURE — 99395 PREV VISIT EST AGE 18-39: CPT | Performed by: OBSTETRICS & GYNECOLOGY

## 2024-08-07 ASSESSMENT — PAIN SCALES - GENERAL: PAINLEVEL: 0-NO PAIN

## 2024-08-07 ASSESSMENT — ENCOUNTER SYMPTOMS
HEMATOLOGIC/LYMPHATIC NEGATIVE: 0
EYES NEGATIVE: 0
CONSTITUTIONAL NEGATIVE: 0
MUSCULOSKELETAL NEGATIVE: 0
CARDIOVASCULAR NEGATIVE: 0
PSYCHIATRIC NEGATIVE: 0
ALLERGIC/IMMUNOLOGIC NEGATIVE: 0
GASTROINTESTINAL NEGATIVE: 0
NEUROLOGICAL NEGATIVE: 0
ENDOCRINE NEGATIVE: 0
RESPIRATORY NEGATIVE: 0

## 2024-08-07 NOTE — PROGRESS NOTES
Assessment   PLAN  Thank you for coming to your annual exam. We discussed healthy lifestyle, well woman screening, and other diagnoses listed below.    Radha was seen today for contraception.  Diagnoses and all orders for this visit:  Well woman exam with routine gynecological exam (Primary)  Cervical cancer screening  -     THINPREP PAP  -     HPV DNA High Risk With Genotype  Intrauterine contraceptive device threads lost, initial encounter  -     US PELVIS TRANSABDOMINAL WITH TRANSVAGINAL; Future    Please return for your next visit in 1 year.    Jazmyn Medina MD    Subjective     Radha Haley is a 34 y.o. female who is here for a routine exam.   PCP = Betty Mart MD    APE Concerns: none    Other Concerns: Wants to confirm Paragard placement since strings missing. May want Mirena instead of Paragard in the future.    OB History    Para Term  AB Living   4 4 4     4   SAB IAB Ectopic Multiple Live Births         0 4      # Outcome Date GA Lbr Sanket/2nd Weight Sex Type Anes PTL Lv   4 Term 24 38w2d 16:00 3.8 kg F Vag-Spont None  ARA   3 Term  39w0d  3.997 kg M    ARA      Complications: GDM, class A2 ([facility])   2 Term  39w0d  4.848 kg M CS-LTranv   ARA      Complications: Failure to Progress in First Stage   1 Term  39w0d  3.997 kg M Vag-Spont   ARA     GynHx:  Menarche: 14  Menstrual Pattern: Monthly, without dysmenorrhea. A bit heavier than before, may want to switch to Mirena   STIs: denies  Sexual Activity: men, monogamous, no complaints  Contraception: Paragard placed May 2024    Pap Hx: due now    Preventative:  Last mammogram: due age 41  Last Colonoscopy: due age 46  DM Screening: nl 2 hour glucose 2024, repeat DM screening q3 years  DEXA: due age 66  HPV vaccination: not received  Exercise: walks, swimming  Diet: keeps kosher  Seat Belt Use: always    SoHx:  Living Situation: lives with  Marianna and 4 kids  School/Employment: [employer]  Substance: No T/D. 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    Objective   /79   Wt 90 kg (198 lb 6.4 oz)   LMP 2024 (Exact Date)   Breastfeeding No   BMI 34.06 kg/m²      General:   Alert and oriented, in no acute distress   Skin: No significant acne. No hirsutism.   Neck: Supple. No visible thyromegaly.    Breast/Axilla: Normal to palpation bilaterally without masses, skin changes, or nipple discharge.    Abdomen: Soft, non-tender, without masses or organomegaly   Vulva: Normal architecture without erythema, masses, or lesions.    Vagina: Normal mucosa without lesions, masses, or atrophy. No abnormal vaginal discharge.    Cervix: Normal without masses, lesions, or signs of cervicitis. IUD strings not seen.   Uterus: Normal mobile, non-enlarged uterus    Adnexa: Normal without masses or lesions   Pelvic Floor No POP noted. No high tone pelvic floor    Psych Normal affect. Normal mood.

## 2024-08-12 ENCOUNTER — HOSPITAL ENCOUNTER (OUTPATIENT)
Dept: RADIOLOGY | Facility: CLINIC | Age: 34
Discharge: HOME | End: 2024-08-12
Payer: COMMERCIAL

## 2024-08-12 DIAGNOSIS — T83.32XA INTRAUTERINE CONTRACEPTIVE DEVICE THREADS LOST, INITIAL ENCOUNTER: ICD-10-CM

## 2024-08-12 PROCEDURE — 76830 TRANSVAGINAL US NON-OB: CPT | Performed by: RADIOLOGY

## 2024-08-12 PROCEDURE — 76856 US EXAM PELVIC COMPLETE: CPT | Performed by: RADIOLOGY

## 2024-08-12 PROCEDURE — 76856 US EXAM PELVIC COMPLETE: CPT

## 2024-08-16 ENCOUNTER — TELEPHONE (OUTPATIENT)
Dept: RADIOLOGY | Facility: CLINIC | Age: 34
End: 2024-08-16
Payer: COMMERCIAL

## 2024-08-21 LAB
CYTOLOGY CMNT CVX/VAG CYTO-IMP: NORMAL
HPV HR 12 DNA GENITAL QL NAA+PROBE: NEGATIVE
HPV HR GENOTYPES PNL CVX NAA+PROBE: NEGATIVE
HPV16 DNA SPEC QL NAA+PROBE: NEGATIVE
HPV18 DNA SPEC QL NAA+PROBE: NEGATIVE
LAB AP HPV GENOTYPE QUESTION: YES
LAB AP HPV HR: NORMAL
LABORATORY COMMENT REPORT: NORMAL
LABORATORY COMMENT REPORT: NORMAL
LMP START DATE: NORMAL
PATH REPORT.TOTAL CANCER: NORMAL

## 2025-01-07 ENCOUNTER — APPOINTMENT (OUTPATIENT)
Dept: OBSTETRICS AND GYNECOLOGY | Facility: CLINIC | Age: 35
End: 2025-01-07
Payer: COMMERCIAL

## 2025-01-08 NOTE — PROGRESS NOTES
Repeat TFT at next OB visit.   Minoxidil Pregnancy And Lactation Text: This medication has not been assigned a Pregnancy Risk Category but animal studies failed to show danger with the topical medication. It is unknown if the medication is excreted in breast milk.

## 2025-01-15 ENCOUNTER — PROCEDURE VISIT (OUTPATIENT)
Dept: OBSTETRICS AND GYNECOLOGY | Facility: CLINIC | Age: 35
End: 2025-01-15
Payer: COMMERCIAL

## 2025-01-15 ENCOUNTER — APPOINTMENT (OUTPATIENT)
Dept: OBSTETRICS AND GYNECOLOGY | Facility: CLINIC | Age: 35
End: 2025-01-15
Payer: COMMERCIAL

## 2025-01-15 VITALS
WEIGHT: 208 LBS | HEIGHT: 64 IN | DIASTOLIC BLOOD PRESSURE: 72 MMHG | SYSTOLIC BLOOD PRESSURE: 126 MMHG | BODY MASS INDEX: 35.51 KG/M2

## 2025-01-15 DIAGNOSIS — Z30.432 ENCOUNTER FOR IUD REMOVAL: Primary | ICD-10-CM

## 2025-01-15 DIAGNOSIS — Z30.09 BIRTH CONTROL COUNSELING: ICD-10-CM

## 2025-01-15 RX ORDER — NONOXYNOL 9 4 %
1 GEL WITH PREFILLED APPLICATOR (GRAM) VAGINAL AS NEEDED
Qty: 2.55 G | Refills: 11 | Status: CANCELLED | OUTPATIENT
Start: 2025-01-15

## 2025-01-15 RX ORDER — DIAPHRAGMS, CONTOURED 65 MM-80MM
1 DIAPHRAGM VAGINAL AS NEEDED
Qty: 1 EACH | Refills: 0 | Status: SHIPPED | OUTPATIENT
Start: 2025-01-15 | End: 2026-01-15

## 2025-01-15 ASSESSMENT — ENCOUNTER SYMPTOMS
EYES NEGATIVE: 0
ALLERGIC/IMMUNOLOGIC NEGATIVE: 0
MUSCULOSKELETAL NEGATIVE: 0
PSYCHIATRIC NEGATIVE: 0
CARDIOVASCULAR NEGATIVE: 0
RESPIRATORY NEGATIVE: 0
HEMATOLOGIC/LYMPHATIC NEGATIVE: 0
ENDOCRINE NEGATIVE: 0
CONSTITUTIONAL NEGATIVE: 0
GASTROINTESTINAL NEGATIVE: 0
NEUROLOGICAL NEGATIVE: 0

## 2025-01-15 ASSESSMENT — PAIN SCALES - GENERAL: PAINLEVEL_OUTOF10: 0-NO PAIN

## 2025-01-15 NOTE — PROGRESS NOTES
"SUBJECTIVE    34 y.o.  Having periods female presents for   Chief Complaint   Patient presents with    IUD removal      Pt presents desiring IUD removal. She had a Pargard placed 2024.  She is having several \"allergy\" related symptoms (itching, bloating) and would like it removed. She is planning to use the Dolores diaphragm and gel.    OB/GYN History  No LMP recorded (lmp unknown).    Social History     Substance and Sexual Activity   Sexual Activity Not on file       OB History    Para Term  AB Living   4 4 4     4   SAB IAB Ectopic Multiple Live Births         0 4      # Outcome Date GA Lbr Sanket/2nd Weight Sex Type Anes PTL Lv   4 Term 24 38w2d 16:00 3.8 kg F Vag-Spont None  ARA   3 Term  39w0d  3.997 kg M    ARA      Complications: GDM, class A2 ([facility])   2 Term  39w0d  4.848 kg M CS-LTranv   ARA      Complications: Failure to Progress in First Stage   1 Term  39w0d  3.997 kg M Vag-Spont   ARA       Past Medical History  She has a past medical history of Benign ovarian cyst (10/17/2023), DM (diabetes mellitus), gestational, postpartum condition ([facility]) (10/17/2023), Dyshidrosis (pompholyx) (07/10/2023), and Lichen simplex chronicus (07/10/2023).    Surgical History  She has a past surgical history that includes  section, low transverse.     Social History  She reports that she has never smoked. She has never been exposed to tobacco smoke. She has never used smokeless tobacco. She reports that she does not drink alcohol and does not use drugs.    Medications:    Current Outpatient Medications:     acetaminophen (Tylenol) 500 mg tablet, Take 2 tablets (1,000 mg) by mouth every 6 hours if needed for moderate pain (4 - 6)., Disp: 120 tablet, Rfl: 0    alcohol swabs pads, medicated, Use 1, up to 5 times a day, Disp: 200 each, Rfl: 3    blood-glucose meter misc, Test daily fasting and 1 hour after starting your meals. (Patient not taking: Reported on 1/15/2025), Disp: 1 each, " "Rfl: 0    blood-glucose sensor (Dexcom G7 Sensor) device, Use 1 sensor and change every 10 days (Patient not taking: Reported on 1/15/2025), Disp: 3 each, Rfl: 11    cyanocobalamin (Vitamin B-12) 1,000 mcg tablet, Take 1 tablet (1,000 mcg) by mouth once daily. (Patient not taking: Reported on 1/15/2025), Disp: 30 tablet, Rfl: 2    ferrous sulfate, 325 mg ferrous sulfate, tablet, Take 1 tablet by mouth once daily with breakfast. (Patient not taking: Reported on 1/15/2025), Disp: 30 tablet, Rfl: 2    FreeStyle Kassandra reader (FreeStyle Kassandra 2 [place]) misc, Use for monitoring with Libre2 sensor (Patient not taking: Reported on 1/15/2025), Disp: 1 each, Rfl: 0    FreeStyle Kassandra sensor system (FreeStyle Kassandra 2 Sensor) kit, Use 1 sensor and change every 14 days (Patient not taking: Reported on 1/15/2025), Disp: 2 each, Rfl: 11    isopropyl alcohoL 70 % towelette, Test daily before all meals/snacks and once before bedtime. (Patient not taking: Reported on 1/15/2025), Disp: 1 each, Rfl: 0    lancets (OneTouch UltraSoft Lancets) misc, Use 1 lancet 4-6 times a day to test blood sugar during pregnancy (Patient not taking: Reported on 1/15/2025), Disp: 200 each, Rfl: 3    pen needle, diabetic 32 gauge x 5/32\" needle, Use one for ea injection (Patient not taking: Reported on 1/15/2025), Disp: 100 each, Rfl: 11    Prenatal Vitamin 27 mg iron- 0.8 mg tablet, Take 1 tablet by mouth once daily. (Patient not taking: Reported on 1/15/2025), Disp: 30 tablet, Rfl: 11    triamcinolone (Kenalog) 0.1 % ointment, Apply topically 2 times a day., Disp: 60 g, Rfl: 2    Screenings  Social Drivers of Health     Intimate Partner Violence: Not At Risk (4/6/2024)    Humiliation, Afraid, Rape, and Kick questionnaire     Fear of Current or Ex-Partner: No     Emotionally Abused: No     Physically Abused: No     Sexually Abused: No   Social Connections: Not on file   Alcohol Use: Not At Risk (4/5/2024)    AUDIT-C     Frequency of Alcohol Consumption: " "Never     Average Number of Drinks: Patient does not drink     Frequency of Binge Drinking: Never   Tobacco Use: Low Risk  (1/15/2025)    Patient History     Smoking Tobacco Use: Never     Smokeless Tobacco Use: Never     Passive Exposure: Never   Financial Resource Strain: Low Risk  (2024)    Overall Financial Resource Strain (CARDIA)     Difficulty of Paying Living Expenses: Not hard at all   Depression: Not at risk (2024)    PHQ-2     PHQ-2 Score: 0   Postpartum Depression: Medium Risk (2024)    [place]  Depression Scale     Last EPDS Total Score: 7     Last EPDS Self Harm Result: Never   Stress: Not on file   Physical Activity: Not on file   Food Insecurity: No Food Insecurity (2024)    Hunger Vital Sign     Worried About Running Out of Food in the Last Year: Never true     Ran Out of Food in the Last Year: Never true   Transportation Needs: No Transportation Needs (2024)    PRAPARE - Transportation     Lack of Transportation (Medical): No     Lack of Transportation (Non-Medical): No         OBJECTIVE  Vitals:    01/15/25 1327   BP: 126/72   BP Location: Right arm   Weight: 94.3 kg (208 lb)   Height: 1.626 m (5' 4\")     Body mass index is 35.7 kg/m².     Chaperone: Present: yes  Procedure: IUD removal  Risks, benefits, and alternatives were reviewed and written consent was obtained.  Speculum exam performed; string visualized. IUD removed without incident.    ASSESSMENT & PLAN  Problem List Items Addressed This Visit    None  Visit Diagnoses       Encounter for IUD removal    -  Primary    Birth control counseling        Relevant Medications    diaphragms, contoured (Caya Contoured) 65-80 mm diaphragm            Follow up: jacob Hammond MD  Obstetrics & Gynecology  01/15/25  "

## 2025-01-16 ENCOUNTER — TELEPHONE (OUTPATIENT)
Dept: OBSTETRICS AND GYNECOLOGY | Facility: CLINIC | Age: 35
End: 2025-01-16
Payer: COMMERCIAL

## 2025-01-16 NOTE — TELEPHONE ENCOUNTER
RN left [facility] that office is still working on how to obtain Caya Gel   Corinne Wang RN

## 2025-02-11 ENCOUNTER — SPECIALTY PHARMACY (OUTPATIENT)
Dept: PHARMACY | Facility: CLINIC | Age: 35
End: 2025-02-11

## 2025-02-11 ENCOUNTER — APPOINTMENT (OUTPATIENT)
Dept: DERMATOLOGY | Facility: CLINIC | Age: 35
End: 2025-02-11
Payer: COMMERCIAL

## 2025-02-11 DIAGNOSIS — L57.8 SUN-DAMAGED SKIN: ICD-10-CM

## 2025-02-11 DIAGNOSIS — D22.9 MULTIPLE BENIGN MELANOCYTIC NEVI: ICD-10-CM

## 2025-02-11 DIAGNOSIS — L20.89 OTHER ATOPIC DERMATITIS: Primary | ICD-10-CM

## 2025-02-11 DIAGNOSIS — L30.4 INTERTRIGO: ICD-10-CM

## 2025-02-11 DIAGNOSIS — D18.01 CHERRY ANGIOMA: ICD-10-CM

## 2025-02-11 PROCEDURE — 99214 OFFICE O/P EST MOD 30 MIN: CPT | Performed by: DERMATOLOGY

## 2025-02-11 PROCEDURE — 1036F TOBACCO NON-USER: CPT | Performed by: DERMATOLOGY

## 2025-02-11 RX ORDER — TACROLIMUS 1 MG/G
OINTMENT TOPICAL
Qty: 60 G | Refills: 3 | Status: SHIPPED | OUTPATIENT
Start: 2025-02-11

## 2025-02-11 RX ORDER — KETOCONAZOLE 20 MG/G
CREAM TOPICAL
Qty: 30 G | Refills: 11 | Status: SHIPPED | OUTPATIENT
Start: 2025-02-11

## 2025-02-11 ASSESSMENT — DERMATOLOGY PATIENT ASSESSMENT
ARE YOU AN ORGAN TRANSPLANT RECIPIENT: NO
DO YOU USE SUNSCREEN: OCCASIONALLY
ARE YOU TRYING TO GET PREGNANT: NO
DO YOU USE A TANNING BED: NO
HAVE YOU HAD OR DO YOU HAVE VASCULAR DISEASE: NO
ARE YOU ON BIRTH CONTROL: NO
DO YOU HAVE ANY NEW OR CHANGING LESIONS: NO
HAVE YOU HAD OR DO YOU HAVE A STAPH INFECTION: YES
DO YOU HAVE IRREGULAR MENSTRUAL CYCLES: NO

## 2025-02-11 ASSESSMENT — DERMATOLOGY QUALITY OF LIFE (QOL) ASSESSMENT
RATE HOW BOTHERED YOU ARE BY SYMPTOMS OF YOUR SKIN PROBLEM (EG, ITCHING, STINGING BURNING, HURTING OR SKIN IRRITATION): 3
DATE THE QUALITY-OF-LIFE ASSESSMENT WAS COMPLETED: 67247
RATE HOW BOTHERED YOU ARE BY EFFECTS OF YOUR SKIN PROBLEMS ON YOUR ACTIVITIES (EG, GOING OUT, ACCOMPLISHING WHAT YOU WANT, WORK ACTIVITIES OR YOUR RELATIONSHIPS WITH OTHERS): 2
ARE THERE EXCLUSIONS OR EXCEPTIONS FOR THE QUALITY OF LIFE ASSESSMENT: NO
RATE HOW EMOTIONALLY BOTHERED YOU ARE BY YOUR SKIN PROBLEM (FOR EXAMPLE, WORRY, EMBARRASSMENT, FRUSTRATION): 2

## 2025-02-11 ASSESSMENT — ITCH NUMERIC RATING SCALE: HOW SEVERE IS YOUR ITCHING?: 5

## 2025-02-11 ASSESSMENT — PATIENT GLOBAL ASSESSMENT (PGA): PATIENT GLOBAL ASSESSMENT: PATIENT GLOBAL ASSESSMENT:  3 - MODERATE

## 2025-02-11 NOTE — PROGRESS NOTES
Subjective     Radha Haley is a 34 y.o. female who presents for the following: Skin Check (Previously seen at [facility]. Blistering of palms and fingers in the past. H/O Impetigo, Lichen Simplex Chronicus.  ).     LOV 2023 with Dr. Wills, she was treating lichen simplex chronicus on the thighs/groin area; prior treatments included clobetasol, triamcinolone, hydrocortisone 2.5% cream , use of OTC corn starch for drying, jackeline or vaseline recommended for prevention. She was pregnant at that visit.     She has reportedly seen someone at [facility] since LOV with Dr. Wills.     Skin Cancer History  No skin cancer on file.    Review of Systems:  No other skin or systemic complaints other than what is documented elsewhere in the note.    The following portions of the chart were reviewed this encounter and updated as appropriate:       Specialty Problems    None    Past Medical History:  Radha Haley  has a past medical history of Benign ovarian cyst (10/17/2023), DM (diabetes mellitus), gestational, postpartum condition ([facility]) (10/17/2023), Dyshidrosis (pompholyx) (07/10/2023), and Lichen simplex chronicus (07/10/2023).    Past Surgical History:  Radha Haley  has a past surgical history that includes  section, low transverse.    Family History:  Patient family history is not on file.    Social History:  Radha Haley  reports that she has never smoked. She has never been exposed to tobacco smoke. She has never used smokeless tobacco. She reports that she does not drink alcohol and does not use drugs.    Allergies:  Patient has no known allergies.    Current Medications / CAM's:    Current Outpatient Medications:     acetaminophen (Tylenol) 500 mg tablet, Take 2 tablets (1,000 mg) by mouth every 6 hours if needed for moderate pain (4 - 6)., Disp: 120 tablet, Rfl: 0    triamcinolone (Kenalog) 0.1 % ointment, Apply topically 2 times a day., Disp: 60 g, Rfl: 2    alcohol swabs pads, medicated, Use 1, up to  5 times a day (Patient not taking: Reported on 2/11/2025), Disp: 200 each, Rfl: 3    blood-glucose meter misc, Test daily fasting and 1 hour after starting your meals. (Patient not taking: Reported on 2/11/2025), Disp: 1 each, Rfl: 0    blood-glucose sensor (Dexcom G7 Sensor) device, Use 1 sensor and change every 10 days (Patient not taking: Reported on 2/11/2025), Disp: 3 each, Rfl: 11    cyanocobalamin (Vitamin B-12) 1,000 mcg tablet, Take 1 tablet (1,000 mcg) by mouth once daily. (Patient not taking: Reported on 2/11/2025), Disp: 30 tablet, Rfl: 2    diaphragms, contoured (Caya Contoured) 65-80 mm diaphragm, Insert 1 Device into the vagina if needed (with sexual activity). (Patient not taking: Reported on 2/11/2025), Disp: 1 each, Rfl: 0    dupilumab (Dupixent) 300 mg/2 mL pen injector, Inject 600mg (2 pens) subcutaneously in 2 different sites on day 1, Disp: 2 Pen, Rfl: 0    dupilumab (Dupixent) 300 mg/2 mL pen injector, Inject 300mg (1 pen) subcutaneously every 2 weeks, Disp: 2 Pen, Rfl: 3    ferrous sulfate, 325 mg ferrous sulfate, tablet, Take 1 tablet by mouth once daily with breakfast. (Patient not taking: Reported on 2/11/2025), Disp: 30 tablet, Rfl: 2    FreeStyle Kassandra reader (FreeStyle Kassandra 2 [place]) misc, Use for monitoring with Libre2 sensor (Patient not taking: Reported on 2/11/2025), Disp: 1 each, Rfl: 0    FreeStyle Kassandra sensor system (FreeStyle Kassandra 2 Sensor) kit, Use 1 sensor and change every 14 days (Patient not taking: Reported on 2/11/2025), Disp: 2 each, Rfl: 11    isopropyl alcohoL 70 % towelette, Test daily before all meals/snacks and once before bedtime. (Patient not taking: Reported on 2/11/2025), Disp: 1 each, Rfl: 0    ketoconazole (NIZOral) 2 % cream, Apply to rash under breasts and in groin twice daily, Disp: 30 g, Rfl: 11    lancets (OneTouch UltraSoft Lancets) misc, Use 1 lancet 4-6 times a day to test blood sugar during pregnancy (Patient not taking: Reported on 2/11/2025),  "Disp: 200 each, Rfl: 3    pen needle, diabetic 32 gauge x 5/32\" needle, Use one for ea injection (Patient not taking: Reported on 2/11/2025), Disp: 100 each, Rfl: 11    Prenatal Vitamin 27 mg iron- 0.8 mg tablet, Take 1 tablet by mouth once daily. (Patient not taking: Reported on 2/11/2025), Disp: 30 tablet, Rfl: 11    tacrolimus (Protopic) 0.1 % ointment, Apply twice daily ongoing to eczema on arms, hands, legs, body as spot treatment, Disp: 60 g, Rfl: 3     Objective   Well appearing patient in no apparent distress; mood and affect are within normal limits.    A full examination was performed including scalp, head, eyes, ears, nose, lips, neck, chest, axillae, abdomen, back, buttocks, bilateral upper extremities, bilateral lower extremities, hands, feet, fingers, toes, fingernails, and toenails. Limited groin exam with patient consent showing All findings within normal limits unless otherwise noted below.    Dermatitic thin plaques on both forearms diffusely, on right hand with lichenificaiton on some fingertips; diffuse xerosis and follicular prominence on trunk; mild lichenificaiton on anterior right ankle, mid erythema and lichenificaiton on medial thighs    BSA 5-10%    - scattered regular brown macules and papules    - scattered small bright red papules and macules    - scattered tan macules, telangiectasias, and general photo-damage    Left Abdomen (side) - Upper, Left Inframammary Fold, Right Inframammary Fold  Macerated plaques under both breasts with satellitosis          Assessment/Plan   Other atopic dermatitis    Atopic dermatitis- longstanding since childhood with flares with each seasonal change including summer worsening. Frequently reflares after use of topical corticosteroids including prior clobetasol, triamcinolone, hydrocortisone 2.5%, OTC topical emollients. She has had complications with secondary impetiginization. This is affecting her daily quality of life. SCORAD 70.7 today  - reviewed " genetic nature with environmental triggers  - START tacrolimus 0.1% ointment bid to spot treat affected areas on arms, legs, hands ongoing for steroid sparing agent; risks reviewed including safety profile/black box warning (likely not a risk with topical use)  - Start dupilumab. 600mg subcutaneous loading dose divided in 2 sites x1, then 300mg subcutaneously every 2 weeks ongoing. Risks/benefits reviewed including but not limited to injection site reaction, conjunctivitis/keratitis (eye inflammation), body aches, elevated eosinophil counts, recurrent herpes infections, throat pain, and less commonly anaphylaxis and other systemic allergic reactions. Limited human data during pregnancy or lactation, so avoid use if able in these situations, though some recent data suggests safety in use including a 2024 systematic review  - will need to further review maintenance [facility] at [facility]    tacrolimus (Protopic) 0.1 % ointment  Apply twice daily ongoing to eczema on arms, hands, legs, body as spot treatment    Related Procedures  Follow Up In Dermatology - Established Patient    Related Medications  dupilumab (Dupixent) 300 mg/2 mL pen injector  Inject 600mg (2 pens) subcutaneously in 2 different sites on day 1    dupilumab (Dupixent) 300 mg/2 mL pen injector  Inject 300mg (1 pen) subcutaneously every 2 weeks    Intertrigo  Left Abdomen (side) - Upper; Left Inframammary Fold; Right Inframammary Fold    Yeast related nature reviewed  - start ketoconazole 2% cream bid until clear repeat PRN    Related Medications  ketoconazole (NIZOral) 2 % cream  Apply to rash under breasts and in groin twice daily    Multiple benign melanocytic nevi    Benign melanocytic nevi  - Discussed benign nature and that no treatment is necessary unless it becomes painful or increases in size. Patient opts for clinical monitoring at this time.    - Sun protective behavior reviewed and encouraged including the use of over-the-counter sunscreen with  SPF30+ daily (reapply every 1.5 hours when outdoors), UPF clothing, broad rimmed hats, sunglasses, and avoidance of midday sun. Home skin monitoring encouraged and how to monitor for skin cancer (changing or new moles, new rapidly growing or non-healing lesions) reviewed. Patient encouraged to call with interval concerns or changes.      Miguel angioma    Cherry angioma(s)  - Discussed benign nature and that no treatment is necessary unless it becomes painful or increases in size. Patient opts for clinical monitoring at this time.      Sun-damaged skin    Actinically damaged skin-  - Sun protective behavior reviewed and encouraged including the use of over-the-counter sunscreen with SPF30+ daily (reapply every 1.5 hours when outdoors), UPF clothing, broad rimmed hats, sunglasses, and avoidance of midday sun. Home skin monitoring encouraged and how to monitor for skin cancer (changing or new moles, new rapidly growing or non-healing lesions) reviewed. Patient encouraged to call with interval concerns or changes.         Fuv 4 mos atopic derm, starting dupixaugustus Oviedo MD

## 2025-02-18 ENCOUNTER — TELEPHONE (OUTPATIENT)
Dept: DERMATOPATHOLOGY | Facility: CLINIC | Age: 35
End: 2025-02-18
Payer: COMMERCIAL

## 2025-02-18 ENCOUNTER — TELEPHONE (OUTPATIENT)
Dept: DERMATOLOGY | Facility: CLINIC | Age: 35
End: 2025-02-18
Payer: COMMERCIAL

## 2025-02-18 NOTE — TELEPHONE ENCOUNTER
Follow up call to Radha. Informed that her insurance is requesting that she have a 90 day trial with tacrolimus ointment before they will consider approving the Dupixent. A her June appointment Dr. Oviedo will reassess and likely resubmit for prior authorization. Pt verbalized understanding.

## 2025-02-18 NOTE — TELEPHONE ENCOUNTER
----- Message from Neema Oviedo sent at 2/18/2025 11:32 AM EST -----  Regarding: FW: [place] denial Dupixent    ----- Message -----  From: Neema Oviedo MD  Sent: 2/18/2025  11:32 AM EST  To: Michel Enamorado, PharmD; Gayatri Boone PharmD; #  Subject: RE: GISEL Jones                           Kira, will you please let her know that her insurance is requesting that she a 90 day trial with the tacrolimus before they will consider approving the dupixent. Let her know we'll reassess and likely resubmit for prior auth at her June visit as scheduled.  ----- Message -----  From: Esperanza Ramos  Sent: 2/17/2025   4:12 PM EST  To: Michel Enamorado, PharmD; Neema Oviedo MD; #  Subject: FW: GISEL Maher this is for Dr. Oviedo not Dr. Elder.   Esperanza  ----- Message -----  From: Esperanza Ramos  Sent: 2/17/2025   4:08 PM EST  To: Michel Enamorado, PharmD; Gayatri Boone PharmD; #  Subject: [place] denial Dupixent                               Hello,  I received a denial from Medicaid/[employer] for the patient's Dupixent.      Per the plan:  Must have at least 10% body surface area (BSA) involvement AND member has a history of at least 90 days with TWO of the following: topical corticosteroids (for example: triamcinolone or betamethasone), or topical calcineurin inhibitors (for example: Elidel) unless atopic dermatitis is severe and involves greater than 25% of BSA.     Since she is at under 25% Bsa they are wanting the trial of tacrolimus. I saw she was recently prescibed this at her appointment in February so we could try to follow up in 90 days, but please let us know how you would like to proceed.  Thank you!  Esperanza

## 2025-06-09 ENCOUNTER — APPOINTMENT (OUTPATIENT)
Dept: DERMATOLOGY | Facility: CLINIC | Age: 35
End: 2025-06-09
Payer: COMMERCIAL

## 2025-06-09 DIAGNOSIS — L20.89 OTHER ATOPIC DERMATITIS: ICD-10-CM

## 2025-06-09 DIAGNOSIS — L30.9 ECZEMA, UNSPECIFIED TYPE: ICD-10-CM

## 2025-06-09 PROCEDURE — 1036F TOBACCO NON-USER: CPT | Performed by: DERMATOLOGY

## 2025-06-09 PROCEDURE — 99214 OFFICE O/P EST MOD 30 MIN: CPT | Performed by: DERMATOLOGY

## 2025-06-09 RX ORDER — TRIAMCINOLONE ACETONIDE 1 MG/G
OINTMENT TOPICAL
Qty: 30 G | Refills: 1 | Status: SHIPPED | OUTPATIENT
Start: 2025-06-09

## 2025-06-09 ASSESSMENT — PATIENT GLOBAL ASSESSMENT (PGA): PATIENT GLOBAL ASSESSMENT: PATIENT GLOBAL ASSESSMENT:  4 - SEVERE

## 2025-06-09 ASSESSMENT — DERMATOLOGY QUALITY OF LIFE (QOL) ASSESSMENT
RATE HOW BOTHERED YOU ARE BY EFFECTS OF YOUR SKIN PROBLEMS ON YOUR ACTIVITIES (EG, GOING OUT, ACCOMPLISHING WHAT YOU WANT, WORK ACTIVITIES OR YOUR RELATIONSHIPS WITH OTHERS): 2
RATE HOW BOTHERED YOU ARE BY SYMPTOMS OF YOUR SKIN PROBLEM (EG, ITCHING, STINGING BURNING, HURTING OR SKIN IRRITATION): 4
ARE THERE EXCLUSIONS OR EXCEPTIONS FOR THE QUALITY OF LIFE ASSESSMENT: NO
RATE HOW EMOTIONALLY BOTHERED YOU ARE BY YOUR SKIN PROBLEM (FOR EXAMPLE, WORRY, EMBARRASSMENT, FRUSTRATION): 4
DATE THE QUALITY-OF-LIFE ASSESSMENT WAS COMPLETED: 67365
WHAT SINGLE SKIN CONDITION LISTED BELOW IS THE PATIENT ANSWERING THE QUALITY-OF-LIFE ASSESSMENT QUESTIONS ABOUT: DERMATITIS

## 2025-06-09 ASSESSMENT — DERMATOLOGY PATIENT ASSESSMENT
ARE YOU TRYING TO GET PREGNANT: NO
HAVE YOU HAD OR DO YOU HAVE VASCULAR DISEASE: NO
DO YOU USE A TANNING BED: NO
DO YOU USE SUNSCREEN: OCCASIONALLY
ARE YOU AN ORGAN TRANSPLANT RECIPIENT: NO
ARE YOU ON BIRTH CONTROL: NO
DO YOU HAVE IRREGULAR MENSTRUAL CYCLES: NO
HAVE YOU HAD OR DO YOU HAVE A STAPH INFECTION: NO
DO YOU HAVE ANY NEW OR CHANGING LESIONS: NO

## 2025-06-09 ASSESSMENT — ITCH NUMERIC RATING SCALE: HOW SEVERE IS YOUR ITCHING?: 8

## 2025-06-09 NOTE — PROGRESS NOTES
Subjective     Radha Haley is a 34 y.o. female who presents for the following: Dermatitis (Pt feels she has different issues from the last time. Pelvis are continues to be inflamed and itching. Has been using tacrolimus, and hydrocortisone. ).   She tried tacrolimus oint daily for 3 + weeks with ZERO response. Has continue to sparingly use hydrocortisone. Significant daily itch affecting daily quality of life.   Skin Cancer History  Biopsy Log Book  No skin cancers from Specimen Tracking.    Additional History      Review of Systems:  No other skin or systemic complaints other than what is documented elsewhere in the note.    The following portions of the chart were reviewed this encounter and updated as appropriate:       Specialty Problems    None    Past Medical History:  Radha Haley  has a past medical history of Benign ovarian cyst (10/17/2023), DM (diabetes mellitus), gestational, postpartum condition ([facility]) (10/17/2023), Dyshidrosis (pompholyx) (07/10/2023), and Lichen simplex chronicus (07/10/2023).    Past Surgical History:  Radha Haley  has a past surgical history that includes  section, low transverse.    Family History:  Patient family history is not on file.    Social History:  Radha Haley  reports that she has never smoked. She has never been exposed to tobacco smoke. She has never used smokeless tobacco. She reports that she does not drink alcohol and does not use drugs.    Allergies:  Patient has no known allergies.    Current Medications / CAM's:  Current Medications[1]     Objective   Well appearing patient in no apparent distress; mood and affect are within normal limits.    A focused exam of affected areas as designated by patient. Vulvar exam with patient consent and deferral of chaperone.     Vesicular crusted plaque on palm, lichenified hyperpigmented plaques on vulva and medial thighs; defers additional exam noting clear  Right thumb with dermatitis affecting proximal nail fold and  dystrophic thumb nail    BSA 5-10%       Assessment/Plan   OTHER ATOPIC DERMATITIS  Generalized  Atopic dermatitis- lichenified on vulva/thighs and dyshidrotic component with nail changes on R hand.  longstanding since childhood with flares with each seasonal change including summer worsening. Frequently reflares after use of topical corticosteroids including prior clobetasol, triamcinolone, hydrocortisone 2.5%, OTC topical emollients, tacrolimus ointment. She has had complications with secondary impetiginization. This is affecting her daily quality of life. SCORAD 70.7 at LOV 2/2025 with no change at today's visit  - reviewed genetic nature with environmental triggers  - for her vulvar lichenified chronic dermatitis, we did discuss possible superimposed contact dermatitis but she denies use of panty liners, dryer sheets, uses hypoallergenic detergent, denies incontinence to degree that she is sitting in wet underwear and often avoids underwear use, denies any other use of products in the area.   - START triamcinolone 0.1% oint bid to right hand dermatitis, vulvar/thigh dermatitis, and right thumb dermatitis for 2 weeks, then transition to vaseline. - Risks of topical corticosteriods reviewed including skin thinning, easy bruising, discoloration with prolonged use.      - Start dupilumab. 600mg subcutaneous loading dose divided in 2 sites x1, then 300mg subcutaneously every 2 weeks ongoing. Risks/benefits reviewed including but not limited to injection site reaction, conjunctivitis/keratitis (eye inflammation), body aches, elevated eosinophil counts, recurrent herpes infections, throat pain, and less commonly anaphylaxis and other systemic allergic reactions. Limited human data during pregnancy or lactation, so avoid use if able in these situations, though some recent data suggests safety in use including a 2024 systematic review  - sensitive skin care encouraged today including use of hypoallergenic detergents with  use of extra rinse cycle for washing clothes, avoid dryer sheets, use plain petroleum jelly (vaseline) after titrating off of steroids. Use hypoallergenic products to wash/moisturize.   Related Procedures  Follow Up In Dermatology - Established Patient  Follow Up In Dermatology - Established Patient  Related Medications  tacrolimus (Protopic) 0.1 % ointment  Apply twice daily ongoing to eczema on arms, hands, legs, body as spot treatment  dupilumab (Dupixent) 300 mg/2 mL pen injector  Inject 300mg (1 pen) subcutaneously every 2 weeks  dupilumab (Dupixent) 300 mg/2 mL pen injector  Inject 600mg (2 pens) subcutaneously in 2 different sites on day 1  ECZEMA, UNSPECIFIED TYPE      Related Medications  triamcinolone (Kenalog) 0.1 % ointment  Apply to affected area twice daily for 2 weeks, then stop and only use vaseline       Fuv 3-4 months dermatitis  Neema Oviedo MD          [1]   Current Outpatient Medications:     acetaminophen (Tylenol) 500 mg tablet, Take 2 tablets (1,000 mg) by mouth every 6 hours if needed for moderate pain (4 - 6)., Disp: 120 tablet, Rfl: 0    alcohol swabs pads, medicated, Use 1, up to 5 times a day (Patient not taking: Reported on 2/11/2025), Disp: 200 each, Rfl: 3    blood-glucose meter misc, Test daily fasting and 1 hour after starting your meals. (Patient not taking: Reported on 2/11/2025), Disp: 1 each, Rfl: 0    blood-glucose sensor (Dexcom G7 Sensor) device, Use 1 sensor and change every 10 days (Patient not taking: Reported on 2/11/2025), Disp: 3 each, Rfl: 11    cyanocobalamin (Vitamin B-12) 1,000 mcg tablet, Take 1 tablet (1,000 mcg) by mouth once daily. (Patient not taking: Reported on 2/11/2025), Disp: 30 tablet, Rfl: 2    diaphragms, contoured (Caya Contoured) 65-80 mm diaphragm, Insert 1 Device into the vagina if needed (with sexual activity). (Patient not taking: Reported on 2/11/2025), Disp: 1 each, Rfl: 0    dupilumab (Dupixent) 300 mg/2 mL pen injector, Inject 300mg (1 pen)  "subcutaneously every 2 weeks, Disp: 2 Pen, Rfl: 3    dupilumab (Dupixent) 300 mg/2 mL pen injector, Inject 600mg (2 pens) subcutaneously in 2 different sites on day 1, Disp: 2 Pen, Rfl: 0    ferrous sulfate, 325 mg ferrous sulfate, tablet, Take 1 tablet by mouth once daily with breakfast. (Patient not taking: Reported on 2/11/2025), Disp: 30 tablet, Rfl: 2    FreeStyle Kassandra reader (FreeStyle Kassandra 2 [place]) misc, Use for monitoring with Libre2 sensor (Patient not taking: Reported on 2/11/2025), Disp: 1 each, Rfl: 0    FreeStyle Kassandra sensor system (FreeStyle Kassandra 2 Sensor) kit, Use 1 sensor and change every 14 days (Patient not taking: Reported on 2/11/2025), Disp: 2 each, Rfl: 11    isopropyl alcohoL 70 % towelette, Test daily before all meals/snacks and once before bedtime. (Patient not taking: Reported on 2/11/2025), Disp: 1 each, Rfl: 0    ketoconazole (NIZOral) 2 % cream, Apply to rash under breasts and in groin twice daily, Disp: 30 g, Rfl: 11    lancets (OneTouch UltraSoft Lancets) misc, Use 1 lancet 4-6 times a day to test blood sugar during pregnancy (Patient not taking: Reported on 2/11/2025), Disp: 200 each, Rfl: 3    pen needle, diabetic 32 gauge x 5/32\" needle, Use one for ea injection (Patient not taking: Reported on 2/11/2025), Disp: 100 each, Rfl: 11    tacrolimus (Protopic) 0.1 % ointment, Apply twice daily ongoing to eczema on arms, hands, legs, body as spot treatment, Disp: 60 g, Rfl: 3    triamcinolone (Kenalog) 0.1 % ointment, Apply to affected area twice daily for 2 weeks, then stop and only use vaseline, Disp: 30 g, Rfl: 1    "

## 2025-06-09 NOTE — Clinical Note
Atopic dermatitis- lichenified on vulva/thighs and dyshidrotic component with nail changes on R hand.  longstanding since childhood with flares with each seasonal change including summer worsening. Frequently reflares after use of topical corticosteroids including prior clobetasol, triamcinolone, hydrocortisone 2.5%, OTC topical emollients, tacrolimus ointment. She has had complications with secondary impetiginization. This is affecting her daily quality of life. SCORAD 70.7 at LOV 2/2025 with no change at today's visit  - reviewed genetic nature with environmental triggers  - for her vulvar lichenified chronic dermatitis, we did discuss possible superimposed contact dermatitis but she denies use of panty liners, dryer sheets, uses hypoallergenic detergent, denies incontinence to degree that she is sitting in wet underwear and often avoids underwear use, denies any other use of products in the area.   - START triamcinolone 0.1% oint bid to right hand dermatitis, vulvar/thigh dermatitis, and right thumb dermatitis for 2 weeks, then transition to vaseline. - Risks of topical corticosteriods reviewed including skin thinning, easy bruising, discoloration with prolonged use.      - Start dupilumab. 600mg subcutaneous loading dose divided in 2 sites x1, then 300mg subcutaneously every 2 weeks ongoing. Risks/benefits reviewed including but not limited to injection site reaction, conjunctivitis/keratitis (eye inflammation), body aches, elevated eosinophil counts, recurrent herpes infections, throat pain, and less commonly anaphylaxis and other systemic allergic reactions. Limited human data during pregnancy or lactation, so avoid use if able in these situations, though some recent data suggests safety in use including a 2024 systematic review  - sensitive skin care encouraged today including use of hypoallergenic detergents with use of extra rinse cycle for washing clothes, avoid dryer sheets, use plain petroleum jelly  (vaseline) after titrating off of steroids. Use hypoallergenic products to wash/moisturize.

## 2025-06-09 NOTE — Clinical Note
Vesicular crusted plaque on palm, lichenified hyperpigmented plaques on vulva and medial thighs; defers additional exam noting clear  Right thumb with dermatitis affecting proximal nail fold and dystrophic thumb nail    BSA 5-10%

## 2025-06-22 ENCOUNTER — TELEPHONE (OUTPATIENT)
Dept: DERMATOLOGY | Facility: CLINIC | Age: 35
End: 2025-06-22
Payer: COMMERCIAL

## 2025-06-24 PROCEDURE — RXMED WILLOW AMBULATORY MEDICATION CHARGE

## 2025-06-26 ENCOUNTER — SPECIALTY PHARMACY (OUTPATIENT)
Dept: PHARMACY | Facility: CLINIC | Age: 35
End: 2025-06-26

## 2025-06-28 ENCOUNTER — PHARMACY VISIT (OUTPATIENT)
Dept: PHARMACY | Facility: CLINIC | Age: 35
End: 2025-06-28
Payer: MEDICAID

## 2025-07-02 ENCOUNTER — SPECIALTY PHARMACY (OUTPATIENT)
Dept: PHARMACY | Facility: CLINIC | Age: 35
End: 2025-07-02

## 2025-07-02 NOTE — PROGRESS NOTES
[facility] Clinical Note  Initial Patient Education     Introduction  Radha Haley is a 35 y.o. female who is on the specialty pharmacy service for management of: Dermatology Core.    Radha Haley is initiating the following therapy: dupilumab (Dupixent) 300 mg/2 mL pen injector  Inject 600 mg (2 pens) subcutaneously in 2 different sites on day 1, followed by 300 mg once every other week.        Medication receipt date: 07/01/25    Duration of therapy: Maintenance    The most recent encounter visit with the referring prescriber Neema Oviedo MD  on 06/09/25 was reviewed.  Pharmacy will continue to collaborate in the care of this patient with the referring prescriber.    Clinical Background  An initial assessment was conducted prior to first fill of the medication to determine the appropriateness of therapy given the patient's diagnosis, medication list, comorbidities, allergies, medical history, patient's ability to self administer medication, and therapeutic goals based on possible outcomes of therapy. Refer to initial assessment task completed on 06/24/25.    Labs for clinical appropriateness that were reviewed include:   Dermatology- No lab monitoring needed- There are no routine laboratory monitoring parameters for this medication    Education/Discussion  Radha was contacted on 7/2/2025 at 11:19 AM for a pharmacy visit with encounter number [id number] from:   [facility]  [address] [place] [id number]  Dept: [phone]  Dept Fax: [phone]  Radha consented to a/an Telephone visit, which was performed.    Medication Start Date (planned or actual): 07/05/25 or 07/05/26         Education was conducted prior to start of therapy? Yes    Education discussed includes the following:  Patient Education  Counseled the Patient on the Following : Doses and administration, Adherence and missed doses, Theraputic rationale and expected outcomes, Possible  "side effects and management, Possible drug interactions, Lab monitoring and follow-up, Safe handling, storage, and disposal, Associated vaccinations, Pharmacy contact information  Learner: Patient  Education Method: Explanation  Education Response: Verbalizes understanding  Additional details of the medication specific counseling are found within the linked patient education flowsheet.     The follow up timeline was discussed. Every person responds to and reacts to therapy differently. Patient should be assessed for efficacy and tolerability in approximately: other - 4 months    Provided education on goals and possible outcomes of therapy:  Adherence with therapy  Timely completion of appropriate labs  Timely and appropriate follow up with provider  Identify and address medication interactions with presciption medications, OTC medications and supplements  Optimize or maintain quality of life  Dermatology: Prevent or reduce disease flares  Reduce pain, itchiness, inflammation and body surface area affected by atopic dermatitis           The importance of adherence was discussed and they were advised to take the medication as prescribed by their provider.         Impression/Plan  Review and Assessment   Reviewed During This Encounter: Medications  Medications Assessed for Appropriate Use, Dose, Route, Frequency, and Duration: Yes  Medication Reconciliation Completed: Yes  Drug Interactions Evaluated: Yes  Clinically Relevant Drug Interactions Identified: No    This patient has not been identified as high risk due to Lack of high risk qualifiers.  The following action was taken: N/A.    QOL/Patient Satisfaction  Rate your quality of life on scale of 1-10: 7 (Patient reported \"7-8\")  Rate your satisfaction with  Specialty Pharmacy on scale of 1-10: 10 - Completely satisfied    The  Specialty Pharmacy Welcome packet may be viewed here:   Specialty Pharmacy Welcome Packet     Or by scanning QR code:      Provided " contact information ([phone]) for [facility] and reviewed dispensing process, refill timeline and patient management follow up. Advised to contact the pharmacy if there are any adverse effects and/or changes to medication list, including prescriptions, OTC medications, herbal products, or supplements. Confirmed understanding of education conducted during assessment. All questions and concerns were addressed and patient was encouraged to reach out for additional questions or concerns.      Boby SinghD

## 2025-07-28 ENCOUNTER — SPECIALTY PHARMACY (OUTPATIENT)
Dept: PHARMACY | Facility: CLINIC | Age: 35
End: 2025-07-28

## 2025-09-10 ENCOUNTER — APPOINTMENT (OUTPATIENT)
Dept: DERMATOLOGY | Facility: CLINIC | Age: 35
End: 2025-09-10
Payer: COMMERCIAL